# Patient Record
Sex: MALE | Race: WHITE | Employment: OTHER | URBAN - METROPOLITAN AREA
[De-identification: names, ages, dates, MRNs, and addresses within clinical notes are randomized per-mention and may not be internally consistent; named-entity substitution may affect disease eponyms.]

---

## 2020-12-08 ENCOUNTER — VIRTUAL VISIT (OUTPATIENT)
Dept: PRIMARY CARE CLINIC | Age: 75
End: 2020-12-08
Payer: MEDICARE

## 2020-12-08 PROCEDURE — 1123F ACP DISCUSS/DSCN MKR DOCD: CPT | Performed by: STUDENT IN AN ORGANIZED HEALTH CARE EDUCATION/TRAINING PROGRAM

## 2020-12-08 PROCEDURE — G8427 DOCREV CUR MEDS BY ELIG CLIN: HCPCS | Performed by: STUDENT IN AN ORGANIZED HEALTH CARE EDUCATION/TRAINING PROGRAM

## 2020-12-08 PROCEDURE — 1036F TOBACCO NON-USER: CPT | Performed by: STUDENT IN AN ORGANIZED HEALTH CARE EDUCATION/TRAINING PROGRAM

## 2020-12-08 PROCEDURE — G8484 FLU IMMUNIZE NO ADMIN: HCPCS | Performed by: STUDENT IN AN ORGANIZED HEALTH CARE EDUCATION/TRAINING PROGRAM

## 2020-12-08 PROCEDURE — 3017F COLORECTAL CA SCREEN DOC REV: CPT | Performed by: STUDENT IN AN ORGANIZED HEALTH CARE EDUCATION/TRAINING PROGRAM

## 2020-12-08 PROCEDURE — 4040F PNEUMOC VAC/ADMIN/RCVD: CPT | Performed by: STUDENT IN AN ORGANIZED HEALTH CARE EDUCATION/TRAINING PROGRAM

## 2020-12-08 PROCEDURE — 99203 OFFICE O/P NEW LOW 30 MIN: CPT | Performed by: STUDENT IN AN ORGANIZED HEALTH CARE EDUCATION/TRAINING PROGRAM

## 2020-12-08 PROCEDURE — G8421 BMI NOT CALCULATED: HCPCS | Performed by: STUDENT IN AN ORGANIZED HEALTH CARE EDUCATION/TRAINING PROGRAM

## 2020-12-08 RX ORDER — MONTELUKAST SODIUM 10 MG/1
10 TABLET ORAL NIGHTLY
Qty: 30 TABLET | Refills: 1 | Status: SHIPPED | OUTPATIENT
Start: 2020-12-08

## 2020-12-08 RX ORDER — METOPROLOL SUCCINATE 25 MG/1
12.5 TABLET, EXTENDED RELEASE ORAL DAILY
Qty: 30 TABLET | Refills: 0 | Status: SHIPPED | OUTPATIENT
Start: 2020-12-08

## 2020-12-08 RX ORDER — BLOOD PRESSURE TEST KIT
1 KIT MISCELLANEOUS DAILY
Qty: 1 KIT | Refills: 0 | Status: SHIPPED | OUTPATIENT
Start: 2020-12-08

## 2020-12-08 RX ORDER — LEVETIRACETAM 500 MG/1
500 TABLET ORAL 2 TIMES DAILY
Qty: 60 TABLET | Refills: 1 | Status: SHIPPED | OUTPATIENT
Start: 2020-12-08

## 2020-12-08 RX ORDER — CALCIUM CARB/VITAMIN D3/VIT K1 500-100-40
1 TABLET,CHEWABLE ORAL DAILY
Qty: 100 EACH | Refills: 3 | Status: SHIPPED | OUTPATIENT
Start: 2020-12-08

## 2020-12-08 RX ORDER — FOLIC ACID 1 MG/1
1 TABLET ORAL DAILY
COMMUNITY
End: 2020-12-08 | Stop reason: SDUPTHER

## 2020-12-08 RX ORDER — METOPROLOL SUCCINATE 25 MG/1
12.5 TABLET, EXTENDED RELEASE ORAL DAILY
COMMUNITY
End: 2020-12-08

## 2020-12-08 RX ORDER — FOLIC ACID 1 MG/1
1 TABLET ORAL DAILY
Qty: 30 TABLET | Refills: 2 | Status: SHIPPED | OUTPATIENT
Start: 2020-12-08 | End: 2020-12-09

## 2020-12-08 RX ORDER — NITROFURANTOIN 25; 75 MG/1; MG/1
100 CAPSULE ORAL 2 TIMES DAILY
Status: ON HOLD | COMMUNITY
End: 2020-12-14 | Stop reason: HOSPADM

## 2020-12-08 RX ORDER — LEVETIRACETAM 500 MG/1
500 TABLET ORAL 2 TIMES DAILY
COMMUNITY
End: 2020-12-08 | Stop reason: SDUPTHER

## 2020-12-08 RX ORDER — MONTELUKAST SODIUM 10 MG/1
10 TABLET ORAL NIGHTLY
COMMUNITY
End: 2020-12-08 | Stop reason: SDUPTHER

## 2020-12-08 RX ORDER — SERTRALINE HYDROCHLORIDE 25 MG/1
25 TABLET, FILM COATED ORAL DAILY
Qty: 30 TABLET | Refills: 1 | Status: SHIPPED | OUTPATIENT
Start: 2020-12-08

## 2020-12-08 SDOH — HEALTH STABILITY: MENTAL HEALTH: HOW OFTEN DO YOU HAVE A DRINK CONTAINING ALCOHOL?: NEVER

## 2020-12-08 SDOH — ECONOMIC STABILITY: TRANSPORTATION INSECURITY
IN THE PAST 12 MONTHS, HAS LACK OF TRANSPORTATION KEPT YOU FROM MEETINGS, WORK, OR FROM GETTING THINGS NEEDED FOR DAILY LIVING?: NO

## 2020-12-08 SDOH — ECONOMIC STABILITY: INCOME INSECURITY: HOW HARD IS IT FOR YOU TO PAY FOR THE VERY BASICS LIKE FOOD, HOUSING, MEDICAL CARE, AND HEATING?: NOT HARD AT ALL

## 2020-12-08 SDOH — ECONOMIC STABILITY: FOOD INSECURITY: WITHIN THE PAST 12 MONTHS, THE FOOD YOU BOUGHT JUST DIDN'T LAST AND YOU DIDN'T HAVE MONEY TO GET MORE.: NEVER TRUE

## 2020-12-08 SDOH — ECONOMIC STABILITY: FOOD INSECURITY: WITHIN THE PAST 12 MONTHS, YOU WORRIED THAT YOUR FOOD WOULD RUN OUT BEFORE YOU GOT MONEY TO BUY MORE.: NEVER TRUE

## 2020-12-08 SDOH — ECONOMIC STABILITY: TRANSPORTATION INSECURITY
IN THE PAST 12 MONTHS, HAS THE LACK OF TRANSPORTATION KEPT YOU FROM MEDICAL APPOINTMENTS OR FROM GETTING MEDICATIONS?: NO

## 2020-12-08 ASSESSMENT — PATIENT HEALTH QUESTIONNAIRE - PHQ9
SUM OF ALL RESPONSES TO PHQ QUESTIONS 1-9: 2
1. LITTLE INTEREST OR PLEASURE IN DOING THINGS: 1
SUM OF ALL RESPONSES TO PHQ QUESTIONS 1-9: 2
SUM OF ALL RESPONSES TO PHQ9 QUESTIONS 1 & 2: 2
SUM OF ALL RESPONSES TO PHQ QUESTIONS 1-9: 2
2. FEELING DOWN, DEPRESSED OR HOPELESS: 1

## 2020-12-08 NOTE — PROGRESS NOTES
2020    TELEHEALTH EVALUATION -- Audio/Visual (During ZDUBG-51 public health emergency)    HPI:    Kiley Galeas (:  1945) has requested an audio/video evaluation for the following concern(s):    Recent admission for subdural hematoma has been falling frequently. Zeynep Noe in July and then started talking about nazi, CT head was normal sent him home then neurologist dx him with dementia then a few weeks later fell again, and had several areas of bleeding in brain, was put on keppra to help blood reabsorb to the brain, was sent to rehab but did not do well, has lost 70 pounds, and has been in bed for past 3 1/2 months, needs physical and occupational therapy. On prasugrel for CAD  Was in a nursing home at [facility] records reviewed today  Wife still lives in [place] and is 21 years younger. Daughter unsure of what medications he is actually on and has just been going by the list that was given to her from the discharge from the hospital [place]. 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 2020 and after taking the pill below started having all of these issues  Was told he needs removal  But then he declined and was started on some pill for [address] metastatic CA which he did not have. Was prescribed from dermatology not oncology   Had imaging done and on elbow x-ray showed osteopenia    Recent UTI  On Macrobid  No fevers, odourous urine, or hematuria  Will have urgency    Dementia  On donepizil but daughter doesn't think he needs it because she feels dementia is from brain bleed and not actual dementia/alzheimers.   Was put on keppra and she has noticed some difference     Falls frequently    CAD  Prasugrel  Rosuvastatin    Type 2 diabetes  Sliding scale insulin NovoLog  Metformin 500 mg daily    Hypertension  Metoprolol 12.5 mg daily    Vitamin D deficiency  Vitamin D 1000 units daily    BPH  Tamsulosin 0.4 mg daily Dutasteride 0.5 mg daily      Review of Systems   Constitutional: Negative for chills and fever. HENT: Negative for congestion, rhinorrhea and sore throat. Eyes: Negative for visual disturbance. Respiratory: Negative for cough and shortness of breath. Cardiovascular: Negative for chest pain. Gastrointestinal: Negative for constipation, diarrhea, nausea and vomiting. Endocrine: Negative for polydipsia and polyuria. Genitourinary: Negative for dysuria. Musculoskeletal: Negative for arthralgias. Skin: Negative for rash. Allergic/Immunologic: Negative for environmental allergies. Neurological: Negative for headaches. Psychiatric/Behavioral: The patient is not nervous/anxious. Prior to Visit Medications    Medication Sig Taking?  Authorizing Provider   Insulin Syringe-Needle U-100 (INSULIN SYRINGE .3CC/31GX5/16\") 31G X 5/16\" 0.3 ML MISC 1 each by Does not apply route daily Yes Catarino Huerta MD   levETIRAcetam (KEPPRA) 500 MG tablet Take 1 tablet by mouth 2 times daily Yes Catarino Huerta MD   metFORMIN (GLUCOPHAGE) 500 MG tablet Take 1 tablet by mouth 2 times daily (with meals) Yes Catarino Huerta MD   montelukast (SINGULAIR) 10 MG tablet Take 1 tablet by mouth nightly Yes Catarino Huerta MD   sertraline (ZOLOFT) 25 MG tablet Take 1 tablet by mouth daily Yes Catarino Huerta MD   metoprolol succinate (TOPROL XL) 25 MG extended release tablet Take 0.5 tablets by mouth daily Yes Catarino Huerta MD   Blood Pressure KIT 1 kit by Does not apply route daily Yes Catarino Huerta MD   dexamethasone (DECADRON) 0.5 MG tablet Take 1 tablet by mouth daily (with breakfast) for 9 days  James Marcos MD   QUEtiapine (SEROQUEL) 25 MG tablet Take 0.5 tablets by mouth 2 times daily as needed for Agitation  DANIEL Hamm   Donepezil HCl (ARICEPT) 23 MG TABS tablet Take 23 mg by mouth nightly  Historical Provider, MD famotidine (PEPCID) 20 MG tablet Take 1 tablet by mouth 2 times daily  Rory Leon MD   folic acid (FOLVITE) 1 MG tablet Take 1 tablet by mouth daily  Rory Leon MD   dutasteride (AVODART) 0.5 MG capsule Take 1 capsule by mouth daily  Rory Leon MD   tamsulosin (FLOMAX) 0.4 MG capsule Take 1 capsule by mouth daily  Rory Leon MD   rosuvastatin (CRESTOR) 40 MG tablet Take 1 tablet by mouth daily  Rory Leon MD   prasugrel (EFFIENT) 5 MG TABS Take 1 tablet by mouth daily  Rory Leon MD       Social History     Tobacco Use    Smoking status: Former Smoker     Types: Cigarettes     Quit date:      Years since quittin.9    Smokeless tobacco: Never Used   Substance Use Topics    Alcohol use: Never     Frequency: Never    Drug use: Never        Physical Exam  Vitals signs reviewed. Constitutional:       General: He is not in acute distress. Appearance: He is ill-appearing. Comments: Patient not able to follow commands, sleeping, moaning   HENT:      Head: Normocephalic and atraumatic. Right Ear: External ear normal.      Left Ear: External ear normal.   Eyes:      General: No scleral icterus. Right eye: No discharge. Left eye: No discharge. Extraocular Movements: Extraocular movements intact. Conjunctiva/sclera: Conjunctivae normal.   Neck:      Musculoskeletal: Neck supple. Pulmonary:      Effort: Pulmonary effort is normal. No respiratory distress. Skin:     Coloration: Skin is not jaundiced. Findings: No lesion or rash. Neurological:      Mental Status: He is alert. Cranial Nerves: No cranial nerve deficit.       Coordination: Coordination normal.   Psychiatric:      Comments: Sleeping       ASSESSMENT/PLAN: 80-year-old with recent subdural hematoma and since then has had steady decline in function. Was diagnosed with Alzheimer's although unsure if any formal evaluation was ever done this was after his subdural hematoma. He is on a lot of medications for which I am not entirely sure he needs to be on. We will continue most of them but do not think he should be on Aricept as this probably has been making his level of consciousness worse. We will get him set up for home health care and would like to follow-up in a couple of weeks. 1. Subdural hematoma ([facility] [place] [address]  - [address], LSW, Social Work, [employer]  - OT home evaluation; Future  - PT home eval; Future  - AFL - Diamond Paige MD, Neurology, Annette Whitley MD, Oncology, Kevin Nguyen MD, Cardiology, [facility]  - External Referral To Home Health    2. Late onset Alzheimer's disease without behavioral disturbance ([facility] [place] [address]  Decrease Aricept sent this seems to be making his level consciousness worse per daughter. Return in about 2 weeks (around 12/22/2020). Christian Lam is a 76 y.o. male being evaluated by a Virtual Visit (video visit) encounter to address concerns as mentioned above. A caregiver was present when appropriate. Due to this being a TeleHealth encounter (During YVRWJ-21 public health emergency), evaluation of the following organ systems was limited: Vitals/Constitutional/EENT/Resp/CV/GI//MS/Neuro/Skin/Heme-Lymph-Imm. Pursuant to the emergency declaration under the [address] [place] [address] and the [employer] Act, this Virtual Visit was conducted with patient's (and/or legal guardian's) consent, to reduce the patient's risk of exposure to COVID-19 and provide necessary medical care. The patient (and/or legal guardian) has also been advised to contact this office for worsening conditions or problems, and seek emergency medical treatment and/or call 911 if deemed necessary. Patient identification was verified at the start of the visit: Yes    Total time spent on this encounter: Not billed by time    Services were provided through a video synchronous discussion virtually to substitute for in-person clinic visit. Patient and provider were located at their individual homes. --Ivett Soto MD on 12/16/2020 at 3:58 PM    An electronic signature was used to authenticate this note.

## 2020-12-08 NOTE — PATIENT INSTRUCTIONS
[address] [facility], [facility]   [address] [facility], [address] [place] [address]   Ph: [phone]     Neurology  [address] [place] [address] [address] [facility], [address]   [facility] [place] [address]   Phone: [phone]   Fax: [phone]     Cardiology  [address], [address] [place] [address], MD   [address] , [address] [facility], [address] [place] [address]   [phone]     Hematology/Oncology  [facility], [address] [place] [address], MD   [address] [facility], [address] [place] [address], [address] [place] [address]   Phone: [phone]   Fax: [phone]

## 2020-12-08 NOTE — Clinical Note
Needs another appointment, would prefer in clinic, but if he cant come that's ok, he will need labs and vitals so hopefully home health can do that.

## 2020-12-09 ENCOUNTER — APPOINTMENT (OUTPATIENT)
Dept: GENERAL RADIOLOGY | Age: 75
DRG: 177 | End: 2020-12-09
Payer: MEDICARE

## 2020-12-09 ENCOUNTER — TELEPHONE (OUTPATIENT)
Dept: PRIMARY CARE CLINIC | Age: 75
End: 2020-12-09

## 2020-12-09 ENCOUNTER — PATIENT MESSAGE (OUTPATIENT)
Dept: PRIMARY CARE CLINIC | Age: 75
End: 2020-12-09

## 2020-12-09 ENCOUNTER — APPOINTMENT (OUTPATIENT)
Dept: CT IMAGING | Age: 75
DRG: 177 | End: 2020-12-09
Payer: MEDICARE

## 2020-12-09 ENCOUNTER — HOSPITAL ENCOUNTER (INPATIENT)
Age: 75
LOS: 4 days | Discharge: HOME HEALTH | DRG: 177 | End: 2020-12-14
Attending: EMERGENCY MEDICINE | Admitting: INTERNAL MEDICINE
Payer: MEDICARE

## 2020-12-09 LAB
A/G RATIO: 1 (ref 1.1–2.2)
ALBUMIN SERPL-MCNC: 3 G/DL (ref 3.4–5)
ALP BLD-CCNC: 147 U/L (ref 40–129)
ALT SERPL-CCNC: 24 U/L (ref 10–40)
ANION GAP SERPL CALCULATED.3IONS-SCNC: 8 MMOL/L (ref 3–16)
AST SERPL-CCNC: 33 U/L (ref 15–37)
BASE EXCESS VENOUS: 3.1 MMOL/L (ref -3–3)
BASOPHILS ABSOLUTE: 0 K/UL (ref 0–0.2)
BASOPHILS RELATIVE PERCENT: 0.4 %
BILIRUB SERPL-MCNC: 0.3 MG/DL (ref 0–1)
BUN BLDV-MCNC: 15 MG/DL (ref 7–20)
CALCIUM SERPL-MCNC: 8.4 MG/DL (ref 8.3–10.6)
CARBOXYHEMOGLOBIN: 1.4 % (ref 0–1.5)
CHLORIDE BLD-SCNC: 89 MMOL/L (ref 99–110)
CO2: 30 MMOL/L (ref 21–32)
CREAT SERPL-MCNC: 0.7 MG/DL (ref 0.8–1.3)
EOSINOPHILS ABSOLUTE: 0 K/UL (ref 0–0.6)
EOSINOPHILS RELATIVE PERCENT: 0.7 %
GFR AFRICAN AMERICAN: >60
GFR NON-AFRICAN AMERICAN: >60
GLOBULIN: 3.1 G/DL
GLUCOSE BLD-MCNC: 103 MG/DL (ref 70–99)
HCO3 VENOUS: 28.5 MMOL/L (ref 23–29)
HCT VFR BLD CALC: 35.4 % (ref 40.5–52.5)
HEMOGLOBIN: 12.1 G/DL (ref 13.5–17.5)
LACTIC ACID: 0.9 MMOL/L (ref 0.4–2)
LYMPHOCYTES ABSOLUTE: 1.1 K/UL (ref 1–5.1)
LYMPHOCYTES RELATIVE PERCENT: 15.8 %
MAGNESIUM: 1.8 MG/DL (ref 1.8–2.4)
MCH RBC QN AUTO: 31.7 PG (ref 26–34)
MCHC RBC AUTO-ENTMCNC: 34.2 G/DL (ref 31–36)
MCV RBC AUTO: 92.6 FL (ref 80–100)
METHEMOGLOBIN VENOUS: 0.3 %
MONOCYTES ABSOLUTE: 0.6 K/UL (ref 0–1.3)
MONOCYTES RELATIVE PERCENT: 8.6 %
NEUTROPHILS ABSOLUTE: 5.1 K/UL (ref 1.7–7.7)
NEUTROPHILS RELATIVE PERCENT: 74.5 %
O2 CONTENT, VEN: 16 VOL %
O2 SAT, VEN: 87 %
O2 THERAPY: ABNORMAL
PCO2, VEN: 46.7 MMHG (ref 40–50)
PDW BLD-RTO: 13.3 % (ref 12.4–15.4)
PH VENOUS: 7.4 (ref 7.35–7.45)
PLATELET # BLD: 381 K/UL (ref 135–450)
PMV BLD AUTO: 7 FL (ref 5–10.5)
PO2, VEN: 51.9 MMHG (ref 25–40)
POTASSIUM REFLEX MAGNESIUM: 3.2 MMOL/L (ref 3.5–5.1)
PRO-BNP: 582 PG/ML (ref 0–449)
RBC # BLD: 3.82 M/UL (ref 4.2–5.9)
SARS-COV-2, NAAT: DETECTED
SODIUM BLD-SCNC: 127 MMOL/L (ref 136–145)
TCO2 CALC VENOUS: 30 MMOL/L
TOTAL PROTEIN: 6.1 G/DL (ref 6.4–8.2)
TROPONIN: <0.01 NG/ML
WBC # BLD: 6.8 K/UL (ref 4–11)

## 2020-12-09 PROCEDURE — 96366 THER/PROPH/DIAG IV INF ADDON: CPT

## 2020-12-09 PROCEDURE — 94761 N-INVAS EAR/PLS OXIMETRY MLT: CPT

## 2020-12-09 PROCEDURE — 93005 ELECTROCARDIOGRAM TRACING: CPT | Performed by: EMERGENCY MEDICINE

## 2020-12-09 PROCEDURE — 80053 COMPREHEN METABOLIC PANEL: CPT

## 2020-12-09 PROCEDURE — 70450 CT HEAD/BRAIN W/O DYE: CPT

## 2020-12-09 PROCEDURE — 6370000000 HC RX 637 (ALT 250 FOR IP): Performed by: EMERGENCY MEDICINE

## 2020-12-09 PROCEDURE — 99285 EMERGENCY DEPT VISIT HI MDM: CPT

## 2020-12-09 PROCEDURE — 83605 ASSAY OF LACTIC ACID: CPT

## 2020-12-09 PROCEDURE — 84484 ASSAY OF TROPONIN QUANT: CPT

## 2020-12-09 PROCEDURE — 6360000002 HC RX W HCPCS: Performed by: EMERGENCY MEDICINE

## 2020-12-09 PROCEDURE — 96365 THER/PROPH/DIAG IV INF INIT: CPT

## 2020-12-09 PROCEDURE — 83735 ASSAY OF MAGNESIUM: CPT

## 2020-12-09 PROCEDURE — 85025 COMPLETE CBC W/AUTO DIFF WBC: CPT

## 2020-12-09 PROCEDURE — U0002 COVID-19 LAB TEST NON-CDC: HCPCS

## 2020-12-09 PROCEDURE — 83930 ASSAY OF BLOOD OSMOLALITY: CPT

## 2020-12-09 PROCEDURE — 83880 ASSAY OF NATRIURETIC PEPTIDE: CPT

## 2020-12-09 PROCEDURE — 2500000003 HC RX 250 WO HCPCS: Performed by: EMERGENCY MEDICINE

## 2020-12-09 PROCEDURE — 82803 BLOOD GASES ANY COMBINATION: CPT

## 2020-12-09 PROCEDURE — 2700000000 HC OXYGEN THERAPY PER DAY

## 2020-12-09 PROCEDURE — 2580000003 HC RX 258: Performed by: EMERGENCY MEDICINE

## 2020-12-09 PROCEDURE — 96375 TX/PRO/DX INJ NEW DRUG ADDON: CPT

## 2020-12-09 PROCEDURE — 71045 X-RAY EXAM CHEST 1 VIEW: CPT

## 2020-12-09 RX ORDER — PRASUGREL 5 MG/1
5 TABLET, FILM COATED ORAL DAILY
Qty: 30 TABLET | Refills: 1 | Status: SHIPPED | OUTPATIENT
Start: 2020-12-09

## 2020-12-09 RX ORDER — DUTASTERIDE 0.5 MG/1
0.5 CAPSULE, LIQUID FILLED ORAL DAILY
Qty: 30 CAPSULE | Refills: 3 | Status: SHIPPED | OUTPATIENT
Start: 2020-12-09

## 2020-12-09 RX ORDER — IPRATROPIUM BROMIDE AND ALBUTEROL SULFATE 2.5; .5 MG/3ML; MG/3ML
1 SOLUTION RESPIRATORY (INHALATION) ONCE
Status: COMPLETED | OUTPATIENT
Start: 2020-12-09 | End: 2020-12-09

## 2020-12-09 RX ORDER — FAMOTIDINE 20 MG/1
20 TABLET, FILM COATED ORAL 2 TIMES DAILY
Qty: 180 TABLET | Refills: 1 | Status: SHIPPED | OUTPATIENT
Start: 2020-12-09

## 2020-12-09 RX ORDER — ROSUVASTATIN CALCIUM 40 MG/1
40 TABLET, COATED ORAL DAILY
Qty: 30 TABLET | Refills: 0 | Status: SHIPPED | OUTPATIENT
Start: 2020-12-09

## 2020-12-09 RX ORDER — POTASSIUM CHLORIDE 7.45 MG/ML
10 INJECTION INTRAVENOUS ONCE
Status: COMPLETED | OUTPATIENT
Start: 2020-12-09 | End: 2020-12-09

## 2020-12-09 RX ORDER — TAMSULOSIN HYDROCHLORIDE 0.4 MG/1
0.4 CAPSULE ORAL DAILY
Qty: 90 CAPSULE | Refills: 1 | Status: SHIPPED | OUTPATIENT
Start: 2020-12-09

## 2020-12-09 RX ORDER — 0.9 % SODIUM CHLORIDE 0.9 %
500 INTRAVENOUS SOLUTION INTRAVENOUS ONCE
Status: COMPLETED | OUTPATIENT
Start: 2020-12-09 | End: 2020-12-09

## 2020-12-09 RX ORDER — DEXAMETHASONE SODIUM PHOSPHATE 10 MG/ML
4 INJECTION, SOLUTION INTRAMUSCULAR; INTRAVENOUS ONCE
Status: COMPLETED | OUTPATIENT
Start: 2020-12-09 | End: 2020-12-09

## 2020-12-09 RX ORDER — FOLIC ACID 1 MG/1
1 TABLET ORAL DAILY
Qty: 30 TABLET | Refills: 2 | Status: SHIPPED | OUTPATIENT
Start: 2020-12-09

## 2020-12-09 RX ADMIN — SODIUM CHLORIDE 500 ML: 9 INJECTION, SOLUTION INTRAVENOUS at 20:44

## 2020-12-09 RX ADMIN — POTASSIUM CHLORIDE 10 MEQ: 10 INJECTION, SOLUTION INTRAVENOUS at 20:43

## 2020-12-09 RX ADMIN — IPRATROPIUM BROMIDE AND ALBUTEROL SULFATE 1 AMPULE: .5; 3 SOLUTION RESPIRATORY (INHALATION) at 20:43

## 2020-12-09 RX ADMIN — DEXAMETHASONE SODIUM PHOSPHATE 4 MG: 10 INJECTION, SOLUTION INTRAMUSCULAR; INTRAVENOUS at 21:48

## 2020-12-09 RX ADMIN — DOXYCYCLINE 100 MG: 100 INJECTION, POWDER, LYOPHILIZED, FOR SOLUTION INTRAVENOUS at 21:47

## 2020-12-09 NOTE — TELEPHONE ENCOUNTER
SW Contact:  PC to caregiver per referral for home care assistance. Pt presents with ADL deficits as well as cognitive. Pt is now permanent resident [place] moving from [place]. Caregiver has appt with  for legal decision making paperwork. Discussed the importance of referrals for consults to determine baseline cognition in relation to brain bleed vs dementia and determination of status of disease process. Caregiver states she does not work and handles med administration dressing, bathing and meals for pt. Agreeable to referral to [facility] who will contact her today to schedule a case management visit.

## 2020-12-09 NOTE — TELEPHONE ENCOUNTER
F/U call to daughter who states [facility] has a 2 week waiting list for assessment visits. Referral made to [facility] for caregiver education, PT and OT.

## 2020-12-10 PROBLEM — J96.01 ACUTE RESPIRATORY FAILURE WITH HYPOXIA (HCC): Status: ACTIVE | Noted: 2020-12-10

## 2020-12-10 LAB
ABO/RH: NORMAL
ANTIBODY SCREEN: NORMAL
BACTERIA: ABNORMAL /HPF
BILIRUBIN URINE: NEGATIVE
BLOOD BANK DISPENSE STATUS: NORMAL
BLOOD BANK PRODUCT CODE: NORMAL
BLOOD, URINE: ABNORMAL
BPU ID: NORMAL
CLARITY: CLEAR
COARSE CASTS, UA: ABNORMAL /LPF (ref 0–2)
COLOR: YELLOW
DESCRIPTION BLOOD BANK: NORMAL
EKG ATRIAL RATE: 78 BPM
EKG DIAGNOSIS: NORMAL
EKG P-R INTERVAL: 126 MS
EKG Q-T INTERVAL: 438 MS
EKG QRS DURATION: 100 MS
EKG QTC CALCULATION (BAZETT): 499 MS
EKG R AXIS: 44 DEGREES
EKG T AXIS: 61 DEGREES
EKG VENTRICULAR RATE: 78 BPM
EPITHELIAL CELLS, UA: ABNORMAL /HPF (ref 0–5)
GLUCOSE URINE: NEGATIVE MG/DL
HYALINE CASTS: ABNORMAL /LPF (ref 0–2)
KETONES, URINE: NEGATIVE MG/DL
LEUKOCYTE ESTERASE, URINE: NEGATIVE
MICROSCOPIC EXAMINATION: YES
NITRITE, URINE: NEGATIVE
OSMOLALITY URINE: 446 MOSM/KG (ref 390–1070)
OSMOLALITY: 273 MOSM/KG (ref 280–301)
PH UA: 6 (ref 5–8)
PROTEIN UA: 30 MG/DL
RBC UA: ABNORMAL /HPF (ref 0–4)
RENAL EPITHELIAL, UA: ABNORMAL /HPF (ref 0–1)
SPECIFIC GRAVITY UA: 1.02 (ref 1–1.03)
URINE REFLEX TO CULTURE: ABNORMAL
URINE TYPE: ABNORMAL
UROBILINOGEN, URINE: 0.2 E.U./DL
WBC UA: ABNORMAL /HPF (ref 0–5)

## 2020-12-10 PROCEDURE — 2500000003 HC RX 250 WO HCPCS: Performed by: INTERNAL MEDICINE

## 2020-12-10 PROCEDURE — 6360000002 HC RX W HCPCS: Performed by: INTERNAL MEDICINE

## 2020-12-10 PROCEDURE — 93010 ELECTROCARDIOGRAM REPORT: CPT | Performed by: INTERNAL MEDICINE

## 2020-12-10 PROCEDURE — 6370000000 HC RX 637 (ALT 250 FOR IP): Performed by: INTERNAL MEDICINE

## 2020-12-10 PROCEDURE — 86901 BLOOD TYPING SEROLOGIC RH(D): CPT

## 2020-12-10 PROCEDURE — XW033E5 INTRODUCTION OF REMDESIVIR ANTI-INFECTIVE INTO PERIPHERAL VEIN, PERCUTANEOUS APPROACH, NEW TECHNOLOGY GROUP 5: ICD-10-PCS | Performed by: INTERNAL MEDICINE

## 2020-12-10 PROCEDURE — 81001 URINALYSIS AUTO W/SCOPE: CPT

## 2020-12-10 PROCEDURE — 2580000003 HC RX 258: Performed by: INTERNAL MEDICINE

## 2020-12-10 PROCEDURE — 83935 ASSAY OF URINE OSMOLALITY: CPT

## 2020-12-10 PROCEDURE — 2060000000 HC ICU INTERMEDIATE R&B

## 2020-12-10 PROCEDURE — 6370000000 HC RX 637 (ALT 250 FOR IP): Performed by: PHYSICIAN ASSISTANT

## 2020-12-10 PROCEDURE — 36430 TRANSFUSION BLD/BLD COMPNT: CPT

## 2020-12-10 PROCEDURE — XW13325 TRANSFUSION OF CONVALESCENT PLASMA (NONAUTOLOGOUS) INTO PERIPHERAL VEIN, PERCUTANEOUS APPROACH, NEW TECHNOLOGY GROUP 5: ICD-10-PCS | Performed by: INTERNAL MEDICINE

## 2020-12-10 PROCEDURE — 86850 RBC ANTIBODY SCREEN: CPT

## 2020-12-10 PROCEDURE — 86900 BLOOD TYPING SEROLOGIC ABO: CPT

## 2020-12-10 PROCEDURE — 99223 1ST HOSP IP/OBS HIGH 75: CPT | Performed by: INTERNAL MEDICINE

## 2020-12-10 RX ORDER — TAMSULOSIN HYDROCHLORIDE 0.4 MG/1
0.4 CAPSULE ORAL DAILY
Status: DISCONTINUED | OUTPATIENT
Start: 2020-12-10 | End: 2020-12-14 | Stop reason: HOSPADM

## 2020-12-10 RX ORDER — MAGNESIUM SULFATE IN WATER 40 MG/ML
2 INJECTION, SOLUTION INTRAVENOUS PRN
Status: DISCONTINUED | OUTPATIENT
Start: 2020-12-10 | End: 2020-12-14 | Stop reason: HOSPADM

## 2020-12-10 RX ORDER — PROCHLORPERAZINE EDISYLATE 5 MG/ML
10 INJECTION INTRAMUSCULAR; INTRAVENOUS EVERY 6 HOURS PRN
Status: DISCONTINUED | OUTPATIENT
Start: 2020-12-10 | End: 2020-12-14 | Stop reason: HOSPADM

## 2020-12-10 RX ORDER — METOPROLOL SUCCINATE 25 MG/1
12.5 TABLET, EXTENDED RELEASE ORAL DAILY
Status: DISCONTINUED | OUTPATIENT
Start: 2020-12-10 | End: 2020-12-14 | Stop reason: HOSPADM

## 2020-12-10 RX ORDER — SODIUM CHLORIDE 0.9 % (FLUSH) 0.9 %
10 SYRINGE (ML) INJECTION PRN
Status: DISCONTINUED | OUTPATIENT
Start: 2020-12-10 | End: 2020-12-14 | Stop reason: HOSPADM

## 2020-12-10 RX ORDER — ROSUVASTATIN CALCIUM 10 MG/1
40 TABLET, COATED ORAL DAILY
Status: DISCONTINUED | OUTPATIENT
Start: 2020-12-10 | End: 2020-12-14 | Stop reason: HOSPADM

## 2020-12-10 RX ORDER — ALBUTEROL SULFATE 90 UG/1
2 AEROSOL, METERED RESPIRATORY (INHALATION)
Status: DISCONTINUED | OUTPATIENT
Start: 2020-12-10 | End: 2020-12-10

## 2020-12-10 RX ORDER — SODIUM CHLORIDE 9 MG/ML
INJECTION, SOLUTION INTRAVENOUS CONTINUOUS
Status: DISCONTINUED | OUTPATIENT
Start: 2020-12-10 | End: 2020-12-11

## 2020-12-10 RX ORDER — DEXAMETHASONE SODIUM PHOSPHATE 4 MG/ML
6 INJECTION, SOLUTION INTRA-ARTICULAR; INTRALESIONAL; INTRAMUSCULAR; INTRAVENOUS; SOFT TISSUE DAILY
Status: DISCONTINUED | OUTPATIENT
Start: 2020-12-11 | End: 2020-12-12 | Stop reason: CLARIF

## 2020-12-10 RX ORDER — LEVETIRACETAM 500 MG/1
500 TABLET ORAL 2 TIMES DAILY
Status: DISCONTINUED | OUTPATIENT
Start: 2020-12-10 | End: 2020-12-14 | Stop reason: HOSPADM

## 2020-12-10 RX ORDER — POLYETHYLENE GLYCOL 3350 17 G/17G
17 POWDER, FOR SOLUTION ORAL DAILY PRN
Status: DISCONTINUED | OUTPATIENT
Start: 2020-12-10 | End: 2020-12-14 | Stop reason: HOSPADM

## 2020-12-10 RX ORDER — POTASSIUM CHLORIDE 7.45 MG/ML
10 INJECTION INTRAVENOUS PRN
Status: DISCONTINUED | OUTPATIENT
Start: 2020-12-10 | End: 2020-12-14 | Stop reason: HOSPADM

## 2020-12-10 RX ORDER — SODIUM CHLORIDE 0.9 % (FLUSH) 0.9 %
10 SYRINGE (ML) INJECTION EVERY 12 HOURS SCHEDULED
Status: DISCONTINUED | OUTPATIENT
Start: 2020-12-10 | End: 2020-12-14 | Stop reason: HOSPADM

## 2020-12-10 RX ORDER — METHYLPREDNISOLONE SODIUM SUCCINATE 40 MG/ML
40 INJECTION, POWDER, LYOPHILIZED, FOR SOLUTION INTRAMUSCULAR; INTRAVENOUS EVERY 12 HOURS
Status: DISCONTINUED | OUTPATIENT
Start: 2020-12-10 | End: 2020-12-10

## 2020-12-10 RX ORDER — PRASUGREL 5 MG/1
5 TABLET, FILM COATED ORAL DAILY
Status: DISCONTINUED | OUTPATIENT
Start: 2020-12-10 | End: 2020-12-14 | Stop reason: HOSPADM

## 2020-12-10 RX ORDER — ALBUTEROL SULFATE 90 UG/1
2 AEROSOL, METERED RESPIRATORY (INHALATION) EVERY 6 HOURS PRN
Status: DISCONTINUED | OUTPATIENT
Start: 2020-12-10 | End: 2020-12-10

## 2020-12-10 RX ORDER — FINASTERIDE 5 MG/1
5 TABLET, FILM COATED ORAL DAILY
Status: DISCONTINUED | OUTPATIENT
Start: 2020-12-10 | End: 2020-12-14 | Stop reason: HOSPADM

## 2020-12-10 RX ORDER — ALBUTEROL SULFATE 90 UG/1
2 AEROSOL, METERED RESPIRATORY (INHALATION) EVERY 4 HOURS PRN
Status: DISCONTINUED | OUTPATIENT
Start: 2020-12-10 | End: 2020-12-14 | Stop reason: HOSPADM

## 2020-12-10 RX ORDER — FOLIC ACID 1 MG/1
1 TABLET ORAL DAILY
Status: DISCONTINUED | OUTPATIENT
Start: 2020-12-10 | End: 2020-12-14 | Stop reason: HOSPADM

## 2020-12-10 RX ORDER — ACETAMINOPHEN 650 MG/1
650 SUPPOSITORY RECTAL EVERY 6 HOURS PRN
Status: DISCONTINUED | OUTPATIENT
Start: 2020-12-10 | End: 2020-12-14 | Stop reason: HOSPADM

## 2020-12-10 RX ORDER — 0.9 % SODIUM CHLORIDE 0.9 %
20 INTRAVENOUS SOLUTION INTRAVENOUS ONCE
Status: DISCONTINUED | OUTPATIENT
Start: 2020-12-10 | End: 2020-12-14 | Stop reason: HOSPADM

## 2020-12-10 RX ORDER — ACETAMINOPHEN 325 MG/1
650 TABLET ORAL EVERY 6 HOURS PRN
Status: DISCONTINUED | OUTPATIENT
Start: 2020-12-10 | End: 2020-12-14 | Stop reason: HOSPADM

## 2020-12-10 RX ORDER — MONTELUKAST SODIUM 10 MG/1
10 TABLET ORAL NIGHTLY
Status: DISCONTINUED | OUTPATIENT
Start: 2020-12-10 | End: 2020-12-14 | Stop reason: HOSPADM

## 2020-12-10 RX ORDER — FAMOTIDINE 20 MG/1
20 TABLET, FILM COATED ORAL 2 TIMES DAILY
Status: DISCONTINUED | OUTPATIENT
Start: 2020-12-10 | End: 2020-12-14 | Stop reason: HOSPADM

## 2020-12-10 RX ADMIN — SODIUM CHLORIDE: 9 INJECTION, SOLUTION INTRAVENOUS at 18:47

## 2020-12-10 RX ADMIN — FAMOTIDINE 20 MG: 20 TABLET ORAL at 21:39

## 2020-12-10 RX ADMIN — LEVETIRACETAM 500 MG: 500 TABLET ORAL at 09:46

## 2020-12-10 RX ADMIN — PRASUGREL 5 MG: 5 TABLET, FILM COATED ORAL at 09:47

## 2020-12-10 RX ADMIN — METHYLPREDNISOLONE SODIUM SUCCINATE 40 MG: 40 INJECTION, POWDER, FOR SOLUTION INTRAMUSCULAR; INTRAVENOUS at 09:42

## 2020-12-10 RX ADMIN — REMDESIVIR 200 MG: 100 INJECTION, POWDER, LYOPHILIZED, FOR SOLUTION INTRAVENOUS at 04:43

## 2020-12-10 RX ADMIN — FOLIC ACID 1 MG: 1 TABLET ORAL at 09:47

## 2020-12-10 RX ADMIN — FINASTERIDE 5 MG: 5 TABLET, FILM COATED ORAL at 09:48

## 2020-12-10 RX ADMIN — METOPROLOL SUCCINATE 12.5 MG: 25 TABLET, FILM COATED, EXTENDED RELEASE ORAL at 09:48

## 2020-12-10 RX ADMIN — SODIUM CHLORIDE: 9 INJECTION, SOLUTION INTRAVENOUS at 02:58

## 2020-12-10 RX ADMIN — ENOXAPARIN SODIUM 30 MG: 30 INJECTION, SOLUTION INTRAVENOUS; SUBCUTANEOUS at 21:39

## 2020-12-10 RX ADMIN — SERTRALINE HYDROCHLORIDE 25 MG: 50 TABLET ORAL at 09:47

## 2020-12-10 RX ADMIN — MONTELUKAST SODIUM 10 MG: 10 TABLET, COATED ORAL at 22:31

## 2020-12-10 RX ADMIN — LEVETIRACETAM 500 MG: 500 TABLET ORAL at 21:39

## 2020-12-10 RX ADMIN — TAMSULOSIN HYDROCHLORIDE 0.4 MG: 0.4 CAPSULE ORAL at 09:46

## 2020-12-10 RX ADMIN — ROSUVASTATIN CALCIUM 40 MG: 10 TABLET, FILM COATED ORAL at 09:45

## 2020-12-10 ASSESSMENT — PAIN SCALES - GENERAL
PAINLEVEL_OUTOF10: 0
PAINLEVEL_OUTOF10: 0

## 2020-12-10 NOTE — CARE COORDINATION
Case Management Assessment  Initial Evaluation      Patient Name: Amparo Youngblood  YOB: 1945  Diagnosis: Acute respiratory failure with hypoxia ([facility] [address] [J96.01]  Acute respiratory failure with hypoxia ([facility] [address] [J96.01]  Date / Time: 12/9/2020  6:40 PM    Admission status/Date: Assessed while in the Emergency Room  Chart Reviewed: Yes      Patient Casandra Goins: No -pt's contact number is his daughters Angelica Lias:  Yes - daughter Celestine Grey at [phone]      Hospitalization in the last 30 days:  No      Health Care Decision Maker :     (CM - must 1st enter selection under Navigator - emergency contact- Health Care Decision Maker Relationship and pick relationship)   Who do you trust or have selected to make healthcare decisions for you      Met with: Spoke with pt's daughter Carolina Hoff conducted  (bedside/phone): phone call    Current PCP: Dr. Matthias Ames required for SNF : N          3 night stay required - Y-unless waived due to covid-19    ADLS  Support Systems/Care Needs:  Family   Transportation: EMS transportation    Meal Preparation: family     Housing  Living Arrangements: Pt lives with his daughter Myra Claude  Steps: 2  Intent for return to present living arrangements: Yes per daughter Myra Claude  Identified Issues: None noted    Home Care Information  Active with [address] : No Agency:(Services)     Passport/Waiver : No  :                      Phone Number:    Passport/Waiver Services: n/a          Durable Medical Equiptment   DME Provider:   Equipment:   Walker___Cane___RTS___ BSC___Shower Chair_x__Hospital Bed_x__W/C__x__Other________  02 at ____Liter(s)---wears(frequency)_______ HHN ___ CPAP___ BiPap___   N/A___      Home O2 Use :  No    If No for home O2---if presently on O2 during hospitalization:  Yes  if yes CM to follow for potential DC O2 need  Informed of need for care provider to bring portable home O2 doug on day of discharge for nursing to connect prior to leaving:   Not Indicated  Verbalized agreement/Understanding:   Not Indicated    Community Service Affiliation  Dialysis:  No    · Agency:  · Location:  · Dialysis Schedule:  · Phone:   · Fax: Other Community Services: n/a    DISCHARGE PLAN: Explained Case Management role/services. Chart review completed. Pt is in the process of being admitted and is in isolation precautions for Covid-19. Called and spoke with pt's daughter Lucia Smith who completed the assessment. Pt's daughter stated pt owns a home in [place] and was recently at [facility] for 3 months. Lucia Smith had pt medically transported up here last week to live with her and she provides 24/7 care for pt. Pt is bed bound and she is working with the PCP office to get [facility]. Pt will need transportation home. Explained that transportation is not always covered by insurance and it may be private pay. Pt's daughter stated understanding as pt will need transport home. Explained CM would follow for possible needs. She stated understanding and denied current needs from CM. CM will continue to follow for needs on discharge. Please notify CM if needs or concerns arise.

## 2020-12-10 NOTE — CONSULTS
Patient is being seen at the request of Dr. Angela Orellana for a consultation for Covid    HISTORY OF PRESENT ILLNESS: This is a 68-year-old male with a history of diabetes and dementia who presented to the emergency department on 12/9/2020 with a 1 day history of shortness of breath and gurgling respirations noted by his family. On arrival, EMS placed patient on a nonrebreather for hypoxemia. Patient cannot provide additional history    PAST MEDICAL HISTORY:  Past Medical History:   Diagnosis Date    Arthritis     [facility]     Cerebral artery occlusion with cerebral infarction ([facility]     Congenital heart disease     COVID-19 12/09/2020    Type 2 diabetes mellitus without complication ([facility] [facility]      PAST SURGICAL HISTORY:  Past Surgical History:   Procedure Laterality Date    CARDIAC SURGERY      5 stints       FAMILY HISTORY:  Unable to obtain because the patient is non-communicative     SOCIAL HISTORY:   reports that he quit smoking about 34 years ago. His smoking use included cigarettes.  He has never used smokeless tobacco.    Scheduled Meds:   sodium chloride  20 mL Intravenous Once    sodium chloride flush  10 mL Intravenous 2 times per day    enoxaparin  30 mg Subcutaneous BID    finasteride  5 mg Oral Daily    folic acid  1 mg Oral Daily    levETIRAcetam  500 mg Oral BID    metoprolol succinate  12.5 mg Oral Daily    montelukast  10 mg Oral Nightly    prasugrel  5 mg Oral Daily    rosuvastatin  40 mg Oral Daily    sertraline  25 mg Oral Daily    tamsulosin  0.4 mg Oral Daily    methylPREDNISolone  40 mg Intravenous Q12H    [START ON 12/11/2020] remdesivir IVPB  100 mg Intravenous Q24H    famotidine  20 mg Oral BID     Continuous Infusions:   sodium chloride 75 mL/hr at 12/10/20 0258     PRN Meds:  sodium chloride flush, acetaminophen **OR** acetaminophen, polyethylene glycol, potassium chloride, magnesium sulfate, prochlorperazine, albuterol sulfate HFA **AND** ipratropium **AND** MDI changes    Chest imaging was reviewed by me and showed   CXR 12/9/2020 interstitial opacities    Head CT 12/9/2020 no acute intracranial abnormality    ASSESSMENT:  · Acute hypoxemic respiratory failure, normally not on oxygen  · COVID-19 pneumonia  · Hyponatremia  · Dementia -was acute onset, precipitated by trauma. He is nonambulatory at baseline. He is not oriented at baseline.   · H/O seizure d/o     PLAN:  COVID-19 isolation, droplet plus  Supplemental oxygen to maintain SaO2 >92%; wean as tolerated    Remdesevir D#2, LFT monitoring for high risk medication  CCP 1 U on 12/10/20   Steroid and #2, now Decadron 6  Inhaled bronchodilators only as needed, MDI preferred  · Patient is on Effient  · DNR-CCA, spouse told nurse no heroic measures, no machines, DNI

## 2020-12-10 NOTE — PLAN OF CARE
Pt was seen this morning by nocturnist physician. Please see H&P from 12/10/2020. Chart reviewed as below:    Vitals:  /70   Pulse 79   Temp 98.1 °F (36.7 °C)   Resp (!) 37   Ht 5' 10\" (1.778 m)   Wt 133 lb 1.6 oz (60.4 kg)   SpO2 99%   BMI 19.10 kg/m²     Labs:  Na 127, K 3.2, Cl 89, Cr 0.7, Mg 1.8, Glu 103  Serum OSM: 273, urine osm: 446  BNP: 582, Trop neg  ALT/AST normal,   WBC: normal, Hgb 12.1, Plt normal  UA with small blood    Rapid COVID test: POSITIVE    EKG:  Sinus rhythm with Premature atrial complex  slow amplitude P wave  Baseline artifact  Nonspecific ST and T wave abnormality  Prolonged QT rate of 499  Abnormal ECGConfirmed by AUSTIN ROJAS MD ([id number]) on 12/10/2020 7:20:17 AM     Microbiology:  Resp cx: pending    Imaging:    CT Head WO Contrast   Final Result   No acute intracranial abnormality. XR CHEST PORTABLE   Final Result   Increased interstitial markings nonspecific finding could be due to chronic   interstitial thickening, mild edema versus developing atypical infection. Plan:    COVID PNA  Acute Hypoxic Respiratory Failure  - CXR showed increased interstitial markings  - EMS reported O2 at 80% on RA  - Admitted to PCU, tele, droplet + precautions  - supp O2, wean as tolerated  - on Solumedrol, Remdesivir D#1, plan to transfuse CCP  - Pulm consulted    Hyponatremia  - Na 127 on arrival  - serum   - IVF, monitor  - repeat labs tomorrow    Hypokalemia  - K 3.2 today  - repleted  - repeat labs tomorrow    BPH  - cont Proscar and Flomax    GERD  - cont Pepcid    HTN  - stable  - cont BB    HLD  - cont Crestor    Seizure DO  - seizure precautions  - cont Keppra    Dementia  - supportive measures    Note: Pt is on Effient, limited medical hx available in chart.  Will cont here and discuss with patient tomorrow during rounding    DVT Prophylaxis: Lovenox  Diet: Clear Liquid  Code Status: Full code    Will assess pt tomorrow during daily rounds as he was seen this morning by valerio.    Marcelle Ayon PA-C 10:48 AM 12/10/2020

## 2020-12-10 NOTE — CONSULTS
Pharmacy to dose remdesivir per Dr. Pablo Cornelius. Patient is a 75 y/o M who tested positive for COVId-19 and currently on oxygen therapy. Remdesivir Day #1  WBC                BUN/SCr       CrCl                 ALT/AST           Tmax  6.8                   15/0.7              78                    24/33                98.1         Remdesivir 200 mg IVPB x1 dose today, followed by remdesivir 100 mg IVPB q24h x 4 doses for a total of 5 days of therapy.

## 2020-12-10 NOTE — PROGRESS NOTES
Pt is a poor historian and unable to answer admission questions. Pt's daughter, Etelvina Jones, called but there was no answer.

## 2020-12-10 NOTE — PROGRESS NOTES
differ): 88-89% on room air or greater than 92% on FiO2 28- 35% = 2    Peak Flow (asthma only): not applicable = 0    RSI: 5-6 = Q4hr PRN (every four hours as needed) for dyspnea        Plan       Goals: medication delivery    Patient/caregiver was educated on the proper method of use for Respiratory Care Devices:  No: /pt resting      Level of patient/caregiver understanding able to:   ? Verbalize understanding   ? Demonstrate understanding       ? Teach back        ? Needs reinforcement       ? No available caregiver               ? Other:     Response to education:  pt resting     Is patient being placed on Home Treatment Regimen? No     Does the patient have everything they need prior to discharge? NA     Comments: pt assessed and chart reviewed    Plan of Care: albuterol/atrovent prn    Electronically signed by Gabriel Burgos RCP on 12/10/2020 at 4:04 AM    Respiratory Protocol Guidelines     1. Assessment and treatment by Respiratory Therapy will be initiated for medication and therapeutic interventions upon initiation of aerosolized medication. 2. Physician will be contacted for respiratory rate (RR) greater than 35 breaths per minute. Therapy will be held for heart rate (HR) greater than 140 beats per minute, pending direction from physician. 3. Bronchodilators will be administered via Metered Dose Inhaler (MDI) with spacer when the following criteria are met:  a. Alert and cooperative     b. HR < 140 bpm  c. RR < 30 bpm                d. Can demonstrate a 2-3 second inspiratory hold  4. Bronchodilators will be administered via Hand Held Nebulizer [facility]) to patients when ANY of the following criteria are met  a. Incognizant or uncooperative          b. Patients treated with HHN at Home        c. Unable to demonstrate proper use of MDI with spacer     d. RR > 30 bpm   5.  Bronchodilators will be delivered via Metered Dose Inhaler (MDI), HHN, Aerogen to intubated patients on mechanical ventilation. 6. Inhalation medication orders will be delivered and/or substituted as outlined below. Aerosolized Medications Ordering and Administration Guidelines:    1. All Medications will be ordered by a physician, and their frequency and/or modality will be adjusted as defined by the patients Respiratory Severity Index (RSI) score. 2. If the patient does not have documented COPD, consider discontinuing anticholinergics when RSI is less than 9.  3. If the bronchospasm worsens (increased RSI), then the bronchodilator frequency can be increased to a maximum of every 4 hours. If greater than every 4 hours is required, the physician will be contacted. 4. If the bronchospasm improves, the frequency of the bronchodilator can be decreased, based on the patient's RSI, but not less than home treatment regimen frequency. 5. Bronchodilator(s) will be discontinued if patient has a RSI less than 9 and has received no scheduled or as needed treatment for 72  Hrs. Patients Ordered on a Mucolytic Agent:    1. Must always be administered with a bronchodilator. 2. Discontinue if patient experiences worsened bronchospasm, or secretions have lessened to the point that the patient is able to clear them with a cough. Anti-inflammatory and Combination Medications:    1. If the patient lacks prior history of lung disease, is not using inhaled anti-inflammatory medication at home, and lacks wheezing by examination or by history for at least 24 hours, contact physician for possible discontinuation.

## 2020-12-10 NOTE — PROGRESS NOTES
Spoke to pt's wife, Safia Cardenas. Admission questions completed at this time. Spoke about code status and she wishes for the patient to be a DNR-CCA. She states,\" He wouldn't want us to do all of that stuff\". Message sent to Hospitalist concerning code status.

## 2020-12-10 NOTE — ED PROVIDER NOTES
[address] ED      CHIEF COMPLAINT  Shortness of Breath (daughter called squad for sob.  daughter recently took pt out of nursing home. squad reports O2 in 80s on RA. Pt on 3 L NC now at 98%. Pt nonverbal, paperwork says hx of dementia/alz)       HISTORY OF PRESENT ILLNESS  Halley Armstrong is a 76 y.o. male with a past medical history of dementia and diabetes who presents to the ED complaining of shortness of breath. The patient is unable to provide a history. Per EMS, the patient started to become short of breath earlier today and daughter called EMS. He has a history of dementia. He recently moved from an [facility] in [place] and was transported here to live with his daughter. Patient is unable to provide a history I did call his daughter for more information. She states that the patient had been doing fine up until earlier today. She states he started to become short of breath and had \"gurgling\" respirations and therefore she called EMS. He was hypoxic on EMS arrival and placed on nonrebreather. She states that the patient has had a decline in his mental status over the past several months. She states that he was diagnosed with subdural hematomas this past September, and never had repeat CT. She denies any new trauma that she knows of. She states that he has been mumbling and slurring his speech over the past week and this has concerned her. However his mental status has been about baseline according to her. She denies that he has a history of COPD or CHF. No other complaints, modifying factors or associated symptoms. I have reviewed the following from the nursing documentation. Past Medical History:   Diagnosis Date    Congenital heart disease     Type 2 diabetes mellitus without complication ([facility] [address]      No past surgical history on file. No family history on file.   Social History     Socioeconomic History    Marital status:      Spouse name: Not on file    Number of children: Not on file    Years of education: Not on file    Highest education level: Not on file   Occupational History    Not on file   Social Needs    Financial resource strain: Not hard at all    Food insecurity     Worry: Never true     Inability: Never true   [employer] needs     Medical: No     Non-medical: No   Tobacco Use    Smoking status: Former Smoker     Types: Cigarettes     Last attempt to quit:      Years since quittin.9    Smokeless tobacco: Never Used   Substance and Sexual Activity    Alcohol use: Never     Frequency: Never    Drug use: Never    Sexual activity: Not Currently   Lifestyle    Physical activity     Days per week: Not on file     Minutes per session: Not on file    Stress: Not on file   Relationships    Social connections     Talks on phone: Not on file     Gets together: Not on file     Attends Mandaen service: Not on file     Active member of club or organization: Not on file     Attends meetings of clubs or organizations: Not on file     Relationship status: Not on file    Intimate partner violence     Fear of current or ex partner: Not on file     Emotionally abused: Not on file     Physically abused: Not on file     Forced sexual activity: Not on file   Other Topics Concern    Not on file   Social History Narrative    Not on file     No current facility-administered medications for this encounter.       Current Outpatient Medications   Medication Sig Dispense Refill    famotidine (PEPCID) 20 MG tablet Take 1 tablet by mouth 2 times daily 816 tablet 1    folic acid (FOLVITE) 1 MG tablet Take 1 tablet by mouth daily 30 tablet 2    dutasteride (AVODART) 0.5 MG capsule Take 1 capsule by mouth daily 30 capsule 3    tamsulosin (FLOMAX) 0.4 MG capsule Take 1 capsule by mouth daily 90 capsule 1    rosuvastatin (CRESTOR) 40 MG tablet Take 1 tablet by mouth daily 30 tablet 0    prasugrel (EFFIENT) 5 MG TABS Take 1 tablet by mouth daily 30 tablet 1 of 12/09/20   CBC Auto Differential   Result Value Ref Range    WBC 6.8 4.0 - 11.0 K/uL    RBC 3.82 (L) 4.20 - 5.90 M/uL    Hemoglobin 12.1 (L) 13.5 - 17.5 g/dL    Hematocrit 35.4 (L) 40.5 - 52.5 %    MCV 92.6 80.0 - 100.0 fL    MCH 31.7 26.0 - 34.0 pg    MCHC 34.2 31.0 - 36.0 g/dL    RDW 13.3 12.4 - 15.4 %    Platelets 544 215 - 112 K/uL    MPV 7.0 5.0 - 10.5 fL    Neutrophils % 74.5 %    Lymphocytes % 15.8 %    Monocytes % 8.6 %    Eosinophils % 0.7 %    Basophils % 0.4 %    Neutrophils Absolute 5.1 1.7 - 7.7 K/uL    Lymphocytes Absolute 1.1 1.0 - 5.1 K/uL    Monocytes Absolute 0.6 0.0 - 1.3 K/uL    Eosinophils Absolute 0.0 0.0 - 0.6 K/uL    Basophils Absolute 0.0 0.0 - 0.2 K/uL   Comprehensive Metabolic Panel w/ Reflex to MG   Result Value Ref Range    Sodium 127 (L) 136 - 145 mmol/L    Potassium reflex Magnesium 3.2 (L) 3.5 - 5.1 mmol/L    Chloride 89 (L) 99 - 110 mmol/L    CO2 30 21 - 32 mmol/L    Anion Gap 8 3 - 16    Glucose 103 (H) 70 - 99 mg/dL    BUN 15 7 - 20 mg/dL    CREATININE 0.7 (L) 0.8 - 1.3 mg/dL    GFR Non-African American >60 >60    GFR African American >60 >60    Calcium 8.4 8.3 - 10.6 mg/dL    Total Protein 6.1 (L) 6.4 - 8.2 g/dL    Alb 3.0 (L) 3.4 - 5.0 g/dL    Albumin/Globulin Ratio 1.0 (L) 1.1 - 2.2    Total Bilirubin 0.3 0.0 - 1.0 mg/dL    Alkaline Phosphatase 147 (H) 40 - 129 U/L    ALT 24 10 - 40 U/L    AST 33 15 - 37 U/L    Globulin 3.1 g/dL   Troponin   Result Value Ref Range    Troponin <0.01 <0.01 ng/mL   Lactic Acid, Plasma   Result Value Ref Range    Lactic Acid 0.9 0.4 - 2.0 mmol/L   Blood Gas, Venous   Result Value Ref Range    pH, Farhat 7.403 7.350 - 7.450    pCO2, [place]hat 46.7 40.0 - 50.0 mmHg    pO2, [place]hat 51.9 (H) 25.0 - 40.0 mmHg    HCO3, Venous 28.5 23.0 - 29.0 mmol/L    Base Excess, Farhat 3.1 (H) -3.0 - 3.0 mmol/L    O2 Sat, Farhat 87 Not Established %    Carboxyhemoglobin 1.4 0.0 - 1.5 %    MetHgb, Farhat 0.3 <1.5 %    TC02 (Calc), [place] 30 Not Established mmol/L    O2 Content, Far[place] 16 Not Established VOL %    O2 Therapy Unknown    COVID-19   Result Value Ref Range    SARS-CoV-2, NAAT DETECTED (AA) Not Detected   Magnesium   Result Value Ref Range    Magnesium 1.80 1.80 - 2.40 mg/dL   Brain Natriuretic Peptide   Result Value Ref Range    Pro- (H) 0 - 449 pg/mL   EKG 12 Lead   Result Value Ref Range    Ventricular Rate 78 BPM    Atrial Rate 78 BPM    P-R Interval 126 ms    QRS Duration 100 ms    Q-T Interval 438 ms    QTc Calculation (Bazett) 499 ms    R Axis 44 degrees    T Axis 61 degrees    Diagnosis       Sinus rhythm with Premature atrial complexesNonspecific ST and T wave abnormalityProlonged QTAbnormal ECGWhen compared with ECG of 09-DEC-2020 20:14, (unconfirmed)Sinus rhythm has replaced Junctional rhythmNonspecific T wave abnormality, improved in Inferior leadsT wave inversion less evident in Anterolateral leads       ECG  The Ekg interpreted by me shows  normal sinus rhythm with a rate of 78  Axis is   Normal  QTc is  within an acceptable range  Intervals and Durations are unremarkable. ST Segments: nonspecific changes  No prior for comparison     RADIOLOGY  Ct Head Wo Contrast    Result Date: 12/9/2020  EXAMINATION: CT OF THE HEAD WITHOUT CONTRAST  12/9/2020 7:11 pm TECHNIQUE: CT of the head was performed without the administration of intravenous contrast. Dose modulation, iterative reconstruction, and/or weight based adjustment of the mA/kV was utilized to reduce the radiation dose to as low as reasonably achievable. COMPARISON: None. HISTORY: ORDERING SYSTEM PROVIDED HISTORY: confusion, rt. sided facial droop TECHNOLOGIST PROVIDED HISTORY: Reason for exam:->confusion, rt. sided facial droop Has a \"code stroke\" or \"stroke alert\" been called? ->No Reason for Exam: Shortness of Breath (daughter called squad for sob. daughter recently took pt out of nursing home. squad reports O2 in 80s on RA. Pt on 3 L NC now at 98%.  Pt nonverbal, paperwork says hx of dementia/alz) FINDINGS: BRAIN/VENTRICLES:  No acute loss of the gray-white matter differentiation is identified to suggest acute or subacute infarct. No masses or hemorrhages within the brain parenchyma are found. No evidence of midline shift. There is mild periventricular low-attenuation, compatible with chronic small vessel ischemic disease. The intracranial vasculature, including the dural venous sinuses, is within normal limits. ORBITS: No acute orbital abnormalities are identified. SINUSES: Postsurgical changes are seen from previous partial ethmoidectomy and bilateral antrectomies. Mild mucosal thickening within the maxillary sinuses noted. SOFT TISSUES/SKULL: The calvarium is intact. Extracranial soft tissues are unremarkable. No acute intracranial abnormality. Xr Chest Portable    Result Date: 12/9/2020  EXAMINATION: ONE XRAY VIEW OF THE CHEST 12/9/2020 7:22 pm COMPARISON: None. HISTORY: ORDERING SYSTEM PROVIDED HISTORY: hypoxic TECHNOLOGIST PROVIDED HISTORY: Reason for exam:->hypoxic Reason for Exam: Shortness of Breath (daughter called squad for sob. daughter recently took pt out of nursing home. squad reports O2 in 80s on RA. Pt on 3 L NC now at 98%. Pt nonverbal, paperwork says hx of dementia/alz) FINDINGS: The cardiomediastinal silhouette is normal in size and contour. There are mildly increased interstitial markings are both lungs which are nonspecific may be chronic. No pleural effusion or pneumothorax is present. Increased interstitial markings nonspecific finding could be due to chronic interstitial thickening, mild edema versus developing atypical infection. ED COURSE/MDM  Patient seen and evaluated. Old records reviewed. Labs and imaging reviewed and results discussed with patient. Patient is a 66-year-old male presenting with shortness of breath. He is hypoxic in the high 80s on room air on arrival.  He is placed on 3 L nasal cannula and maintains oxygenation's in the mid 90s.   He normally does not wear oxygen. Otherwise he is afebrile and vital signs are within normal limits. Work-up is obtained. He is Covid positive on his rapid test.  Chest x-ray is negative for lobar pneumonia. Patient is treated with Decadron and I did cover him with doxycycline as well. Otherwise he is mildly hyponatremic at 127 likely due to dehydration and he is given fluids. He is hypokalemic at 3.2 and this was replaced as well. EKG is nonischemic and troponin is negative. CT head is negative for acute process this was ordered since the patient has had worsening mentation over the past few weeks per daughter. Patient will require admission for hypoxia from [facility]. I spoke with Dr. Casey Briseno who accepts. During the patient's ED course, the patient was given:  Medications   0.9 % sodium chloride bolus (0 mLs Intravenous Stopped 12/9/20 2147)   ipratropium-albuterol (DUONEB) nebulizer solution 1 ampule (1 ampule Inhalation Given 12/9/20 2043)   potassium chloride 10 mEq/100 mL IVPB (Peripheral Line) (0 mEq Intravenous Stopped 12/9/20 2208)   dexamethasone (PF) (DECADRON) injection 4 mg (4 mg Intravenous Given 12/9/20 2148)   doxycycline (VIBRAMYCIN) 100 mg in dextrose 5 % 100 mL IVPB (0 mg Intravenous Stopped 12/9/20 2328)        CLINICAL IMPRESSION  1. Acute respiratory failure with hypoxia ([facility] [address]    2. COVID-19    3. Hyponatremia    4. Hypokalemia    5. Dehydration        Blood pressure 129/71, pulse 72, temperature 98.1 °F (36.7 °C), temperature source Oral, resp. rate 28, height 5' 10\" (1.778 m), weight 133 lb 1.6 oz (60.4 kg), SpO2 100 %. Patient was given scripts for the following medications. I counseled patient how to take these medications. New Prescriptions    No medications on file       Follow-up with:  No follow-up provider specified. DISCLAIMER: This chart was created using Dragon dictation software.   Efforts were made by me to ensure accuracy, however some errors may be present due to

## 2020-12-10 NOTE — H&P
Hospital Medicine History & Physical      PCP: Juliane Culp MD    Date of Service: Pt seen/examined on 12/10/20 and admitted on 12/10/20 to Inpatient    Chief Complaint   Patient presents with    Shortness of Breath     daughter called karen for sob.  daughter recently took pt out of nursing home. karen reports O2 in 80s on RA. Pt on 3 L NC now at 98%. Pt nonverbal, paperwork says hx of dementia/alz       History Of Present Illness: The patient is a 76 y.o. male with PMH below, presents with SOB/STEIN. Patient is unable to provide history secondary to dementia. Reportedly the patient's daughter reported to ER staff that he had been increasingly short of breath since the a.m. He was found to be Covid positive in the ER. He has been requiring 2 to 3 L of oxygen since arrival.  He is not normally on O2. No additional history available at this time. Past Medical History:        Diagnosis Date    Congenital heart disease     Type 2 diabetes mellitus without complication ([facility] [address]        Past Surgical History:    No past surgical history on file. Medications Prior to Admission:    Prior to Admission medications    Medication Sig Start Date End Date Taking?  Authorizing Provider   famotidine (PEPCID) 20 MG tablet Take 1 tablet by mouth 2 times daily 12/9/20   Juliane Culp MD   folic acid (FOLVITE) 1 MG tablet Take 1 tablet by mouth daily 12/9/20   Juliane Culp MD   dutasteride (AVODART) 0.5 MG capsule Take 1 capsule by mouth daily 12/9/20   Juliane Culp MD   tamsulosin (FLOMAX) 0.4 MG capsule Take 1 capsule by mouth daily 12/9/20   Juliane uClp MD   rosuvastatin (CRESTOR) 40 MG tablet Take 1 tablet by mouth daily 12/9/20   Juliane Culp MD   prasugrel (EFFIENT) 5 MG TABS Take 1 tablet by mouth daily 12/9/20   Juliane Culp MD   nitrofurantoin, macrocrystal-monohydrate, (MACROBID) 100 MG capsule Take 100 mg by mouth 2 times daily    Historical Provider, MD   Insulin Syringe-Needle U-100 (INSULIN SYRINGE .3CC/31GX5/16\") 31G X 5/16\" 0.3 ML MISC 1 each by Does not apply route daily 12/8/20   Lane Dominguez MD   levETIRAcetam (KEPPRA) 500 MG tablet Take 1 tablet by mouth 2 times daily 12/8/20   Lane Dominguez MD   metFORMIN (GLUCOPHAGE) 500 MG tablet Take 1 tablet by mouth 2 times daily (with meals) 12/8/20   Lane Dominguez MD   montelukast (SINGULAIR) 10 MG tablet Take 1 tablet by mouth nightly 12/8/20   Lane Dominguez MD   sertraline (ZOLOFT) 25 MG tablet Take 1 tablet by mouth daily 12/8/20   Lane Dominguez MD   metoprolol succinate (TOPROL XL) 25 MG extended release tablet Take 0.5 tablets by mouth daily 12/8/20   Lane Dominguez MD   Blood Pressure KIT 1 kit by Does not apply route daily 12/8/20   Lane Dominguez MD       Allergies:  Pcn [penicillins]    Social History:    TOBACCO:   reports that he quit smoking about 34 years ago. His smoking use included cigarettes. He has never used smokeless tobacco.  ETOH:   reports no history of alcohol use. Family History:  Reviewed in detail and negative for DM, Early CAD, Cancer (except as below). Positive as follows:    No family history on file. REVIEW OF SYSTEMS:   Unable to obtain secondary to patient dementia. PHYSICAL EXAM PERFORMED:    /70   Pulse 80   Temp 98.1 °F (36.7 °C) (Oral)   Resp 28   Ht 5' 10\" (1.778 m)   Wt 133 lb 1.6 oz (60.4 kg)   SpO2 100%   BMI 19.10 kg/m²     GEN:  Confused, nonsensical speech, NAD.  cachexia  HEENT:  NC/AT,EOMI, dry MM, no erythema/exudates or visible masses. CVS:  Normal S1,S2. RRR. Without M/G/R.   LUNG:   Scattered exp wheezes w/o rales or rhonchi. Increased WOB w/ acc mm use. ABD:  Soft, ND/NT, BS+ x4. Without G/R.  EXT: 2+ pulses, no c/c/e. Brisk cap refill. PSY:  Thought process confused, affect mildly agitated. SHIRLEY:  CN III-XII intact, moves all 4 spontaneously, sensory grossly intact. SKIN: No rash or lesions on visible skin.     Chart review shows recent radiographs:  Ct Head Wo Contrast    Result Date: 12/9/2020  EXAMINATION: CT OF THE HEAD WITHOUT CONTRAST  12/9/2020 7:11 pm TECHNIQUE: CT of the head was performed without the administration of intravenous contrast. Dose modulation, iterative reconstruction, and/or weight based adjustment of the mA/kV was utilized to reduce the radiation dose to as low as reasonably achievable. COMPARISON: None. HISTORY: ORDERING SYSTEM PROVIDED HISTORY: confusion, rt. sided facial droop TECHNOLOGIST PROVIDED HISTORY: Reason for exam:->confusion, rt. sided facial droop Has a \"code stroke\" or \"stroke alert\" been called? ->No Reason for Exam: Shortness of Breath (daughter called squad for sob. daughter recently took pt out of nursing home. squad reports O2 in 80s on RA. Pt on 3 L NC now at 98%. Pt nonverbal, paperwork says hx of dementia/alz) FINDINGS: BRAIN/VENTRICLES:  No acute loss of the gray-white matter differentiation is identified to suggest acute or subacute infarct. No masses or hemorrhages within the brain parenchyma are found. No evidence of midline shift. There is mild periventricular low-attenuation, compatible with chronic small vessel ischemic disease. The intracranial vasculature, including the dural venous sinuses, is within normal limits. ORBITS: No acute orbital abnormalities are identified. SINUSES: Postsurgical changes are seen from previous partial ethmoidectomy and bilateral antrectomies. Mild mucosal thickening within the maxillary sinuses noted. SOFT TISSUES/SKULL: The calvarium is intact. Extracranial soft tissues are unremarkable. No acute intracranial abnormality. Xr Chest Portable    Result Date: 12/9/2020  EXAMINATION: ONE XRAY VIEW OF THE CHEST 12/9/2020 7:22 pm COMPARISON: None. HISTORY: ORDERING SYSTEM PROVIDED HISTORY: hypoxic TECHNOLOGIST PROVIDED HISTORY: Reason for exam:->hypoxic Reason for Exam: Shortness of Breath (daughter called squad for sob. daughter recently took pt out of nursing home.  squad reports O2 in 80s on RA. Pt on 3 L NC now at 98%. Pt nonverbal, paperwork says hx of dementia/alz) FINDINGS: The cardiomediastinal silhouette is normal in size and contour. There are mildly increased interstitial markings are both lungs which are nonspecific may be chronic. No pleural effusion or pneumothorax is present. Increased interstitial markings nonspecific finding could be due to chronic interstitial thickening, mild edema versus developing atypical infection. EKG 12 Lead [[id number]]      Collected: 12/09/20 2016     Updated: 12/10/20 0720      Ventricular Rate  78  BPM     Atrial Rate  78  BPM     P-R Interval  126  ms     QRS Duration  100  ms     Q-T Interval  438  ms     QTc Calculation (Bazett)  499  ms     R Axis  44  degrees     T Axis  61  degrees     Diagnosis  Sinus rhythm with Premature atrial complexeslow amplitude P waveBaseline artifactNonspecific ST and T wave abnormalityProlonged QTAbnormal ECGConfirmed by AUSTIN ROJAS MD ([id number]) on 12/10/2020 7:20:17 AM          CBC:  Recent Labs     12/09/20 1910   WBC 6.8   HGB 12.1*   HCT 35.4*           RENAL  Recent Labs     12/09/20 1910   *   K 3.2*   CL 89*   CO2 30   BUN 15   CREATININE 0.7*   GLUCOSE 103*       Hemoglobin a1c:  No results found for: LABA1C    LFT'S:  Recent Labs     12/09/20 1910   AST 33   ALT 24   BILITOT 0.3   ALKPHOS 147*     CARDIAC ENZYMES:   Recent Labs     12/09/20 1910   TROPONINI <0.01     Lab Results   Component Value Date    PROBNP 582 (H) 12/09/2020       LACTIC ACID:  Recent Labs     12/09/20 1910   LACTA 0.9     VBG:  Recent Labs     12/09/20 1910   PHVEN 7.403   CQK4FBH 46.7   XCT1SUC 28.5   PO2VEN 51.9*   [id number] 87        PHYSICIAN CERTIFICATION  I certify that Robert Leal is expected to be hospitalized for 2 midnights based on the following assessment and plan:    ASSESSMENT/PLAN:  1.  Acute respiratory failure with hypoxia, likely related to #2, supplemental O2 to maintain SPO2 ? 92%, continuous pulse ox. Patient is requiring 2 to 3 L of O2. He is not normally on O2 at home. Wean as tolerated. Pulm c/s.   2. COVID PNA, remdesivir, CCP, solumedrol, droplet +. 3. Hyponatremia, 127, suspect related to hypovolemia. Chk osmos and repeat in am.    4. Hypokalemia, 3.2, replacement protocol. 5. Dementia, supportive measures. 6. BPH, continue home regimen. 7. GERD, continue home regimen. 8. HLD, continue home regimen. 9. History of seizure disorder, continue home regimen. DVT Prophylaxis: intermediate dose Lx  Diet: clears  Code Status: DNR-CCA  PT/OT Eval Status: Will order if needed and as patient condition allows  Dispo - Admit to inpatient     Abeba Howard MD    Thank you Mildred Castleman, MD for the opportunity to be involved in this patient's care. If you have any questions or concerns please feel free to contact me via the [employer] at [phone]. This chart was generated using the [address] [place], [place] [address]  dictation system. I created this record but it may contain dictation errors given the limitations of this technology.

## 2020-12-11 LAB
A/G RATIO: 1 (ref 1.1–2.2)
ALBUMIN SERPL-MCNC: 2.8 G/DL (ref 3.4–5)
ALP BLD-CCNC: 130 U/L (ref 40–129)
ALT SERPL-CCNC: 21 U/L (ref 10–40)
ANION GAP SERPL CALCULATED.3IONS-SCNC: 8 MMOL/L (ref 3–16)
AST SERPL-CCNC: 27 U/L (ref 15–37)
BASOPHILS ABSOLUTE: 0 K/UL (ref 0–0.2)
BASOPHILS RELATIVE PERCENT: 0.3 %
BILIRUB SERPL-MCNC: 0.3 MG/DL (ref 0–1)
BUN BLDV-MCNC: 13 MG/DL (ref 7–20)
CALCIUM SERPL-MCNC: 8.4 MG/DL (ref 8.3–10.6)
CHLORIDE BLD-SCNC: 95 MMOL/L (ref 99–110)
CO2: 29 MMOL/L (ref 21–32)
CREAT SERPL-MCNC: 0.6 MG/DL (ref 0.8–1.3)
EOSINOPHILS ABSOLUTE: 0 K/UL (ref 0–0.6)
EOSINOPHILS RELATIVE PERCENT: 0 %
FERRITIN: 881.2 NG/ML (ref 30–400)
FOLATE: 16.66 NG/ML (ref 4.78–24.2)
GFR AFRICAN AMERICAN: >60
GFR NON-AFRICAN AMERICAN: >60
GLOBULIN: 2.9 G/DL
GLUCOSE BLD-MCNC: 86 MG/DL (ref 70–99)
HCT VFR BLD CALC: 31.9 % (ref 40.5–52.5)
HEMOGLOBIN: 10.9 G/DL (ref 13.5–17.5)
IRON SATURATION: 27 % (ref 20–50)
IRON: 55 UG/DL (ref 59–158)
LYMPHOCYTES ABSOLUTE: 1.7 K/UL (ref 1–5.1)
LYMPHOCYTES RELATIVE PERCENT: 17 %
MAGNESIUM: 1.6 MG/DL (ref 1.8–2.4)
MCH RBC QN AUTO: 32.2 PG (ref 26–34)
MCHC RBC AUTO-ENTMCNC: 34.1 G/DL (ref 31–36)
MCV RBC AUTO: 94.2 FL (ref 80–100)
MONOCYTES ABSOLUTE: 0.7 K/UL (ref 0–1.3)
MONOCYTES RELATIVE PERCENT: 6.6 %
NEUTROPHILS ABSOLUTE: 7.7 K/UL (ref 1.7–7.7)
NEUTROPHILS RELATIVE PERCENT: 76.1 %
PDW BLD-RTO: 14.2 % (ref 12.4–15.4)
PLATELET # BLD: 385 K/UL (ref 135–450)
PMV BLD AUTO: 7.2 FL (ref 5–10.5)
POTASSIUM REFLEX MAGNESIUM: 3.3 MMOL/L (ref 3.5–5.1)
RBC # BLD: 3.39 M/UL (ref 4.2–5.9)
SODIUM BLD-SCNC: 132 MMOL/L (ref 136–145)
TOTAL IRON BINDING CAPACITY: 206 UG/DL (ref 260–445)
TOTAL PROTEIN: 5.7 G/DL (ref 6.4–8.2)
VITAMIN B-12: 1498 PG/ML (ref 211–911)
WBC # BLD: 10.2 K/UL (ref 4–11)

## 2020-12-11 PROCEDURE — 6370000000 HC RX 637 (ALT 250 FOR IP): Performed by: INTERNAL MEDICINE

## 2020-12-11 PROCEDURE — 6360000002 HC RX W HCPCS: Performed by: INTERNAL MEDICINE

## 2020-12-11 PROCEDURE — 6360000002 HC RX W HCPCS: Performed by: PHYSICIAN ASSISTANT

## 2020-12-11 PROCEDURE — 80053 COMPREHEN METABOLIC PANEL: CPT

## 2020-12-11 PROCEDURE — 2060000000 HC ICU INTERMEDIATE R&B

## 2020-12-11 PROCEDURE — 82746 ASSAY OF FOLIC ACID SERUM: CPT

## 2020-12-11 PROCEDURE — 82607 VITAMIN B-12: CPT

## 2020-12-11 PROCEDURE — 83540 ASSAY OF IRON: CPT

## 2020-12-11 PROCEDURE — 6370000000 HC RX 637 (ALT 250 FOR IP): Performed by: PHYSICIAN ASSISTANT

## 2020-12-11 PROCEDURE — 82728 ASSAY OF FERRITIN: CPT

## 2020-12-11 PROCEDURE — 99233 SBSQ HOSP IP/OBS HIGH 50: CPT | Performed by: INTERNAL MEDICINE

## 2020-12-11 PROCEDURE — 2580000003 HC RX 258: Performed by: INTERNAL MEDICINE

## 2020-12-11 PROCEDURE — 83550 IRON BINDING TEST: CPT

## 2020-12-11 PROCEDURE — 36415 COLL VENOUS BLD VENIPUNCTURE: CPT

## 2020-12-11 PROCEDURE — 85025 COMPLETE CBC W/AUTO DIFF WBC: CPT

## 2020-12-11 PROCEDURE — 83735 ASSAY OF MAGNESIUM: CPT

## 2020-12-11 PROCEDURE — 2500000003 HC RX 250 WO HCPCS: Performed by: INTERNAL MEDICINE

## 2020-12-11 RX ORDER — DONEPEZIL HYDROCHLORIDE 23 MG/1
23 TABLET, FILM COATED ORAL NIGHTLY
COMMUNITY

## 2020-12-11 RX ORDER — MAGNESIUM SULFATE IN WATER 40 MG/ML
2 INJECTION, SOLUTION INTRAVENOUS ONCE
Status: COMPLETED | OUTPATIENT
Start: 2020-12-11 | End: 2020-12-11

## 2020-12-11 RX ORDER — DONEPEZIL HYDROCHLORIDE 23 MG/1
23 TABLET, FILM COATED ORAL NIGHTLY
Status: DISCONTINUED | OUTPATIENT
Start: 2020-12-11 | End: 2020-12-14 | Stop reason: HOSPADM

## 2020-12-11 RX ORDER — TRAMADOL HYDROCHLORIDE 50 MG/1
50 TABLET ORAL EVERY 6 HOURS PRN
Status: DISCONTINUED | OUTPATIENT
Start: 2020-12-11 | End: 2020-12-14 | Stop reason: HOSPADM

## 2020-12-11 RX ADMIN — ENOXAPARIN SODIUM 30 MG: 30 INJECTION, SOLUTION INTRAVENOUS; SUBCUTANEOUS at 20:58

## 2020-12-11 RX ADMIN — TAMSULOSIN HYDROCHLORIDE 0.4 MG: 0.4 CAPSULE ORAL at 09:59

## 2020-12-11 RX ADMIN — DEXAMETHASONE SODIUM PHOSPHATE 6 MG: 4 INJECTION, SOLUTION INTRAMUSCULAR; INTRAVENOUS at 10:48

## 2020-12-11 RX ADMIN — POTASSIUM CHLORIDE 10 MEQ: 7.46 INJECTION, SOLUTION INTRAVENOUS at 15:14

## 2020-12-11 RX ADMIN — POTASSIUM CHLORIDE 10 MEQ: 7.46 INJECTION, SOLUTION INTRAVENOUS at 11:46

## 2020-12-11 RX ADMIN — DONEPEZIL HYDROCHLORIDE 23 MG: 23 TABLET, FILM COATED ORAL at 20:58

## 2020-12-11 RX ADMIN — ENOXAPARIN SODIUM 30 MG: 30 INJECTION, SOLUTION INTRAVENOUS; SUBCUTANEOUS at 09:58

## 2020-12-11 RX ADMIN — POTASSIUM CHLORIDE 10 MEQ: 7.46 INJECTION, SOLUTION INTRAVENOUS at 14:12

## 2020-12-11 RX ADMIN — FINASTERIDE 5 MG: 5 TABLET, FILM COATED ORAL at 09:58

## 2020-12-11 RX ADMIN — MAGNESIUM SULFATE IN WATER 2 G: 40 INJECTION, SOLUTION INTRAVENOUS at 11:46

## 2020-12-11 RX ADMIN — PRASUGREL 5 MG: 5 TABLET, FILM COATED ORAL at 09:58

## 2020-12-11 RX ADMIN — ROSUVASTATIN CALCIUM 40 MG: 10 TABLET, FILM COATED ORAL at 09:59

## 2020-12-11 RX ADMIN — LEVETIRACETAM 500 MG: 500 TABLET ORAL at 20:58

## 2020-12-11 RX ADMIN — FAMOTIDINE 20 MG: 20 TABLET ORAL at 09:59

## 2020-12-11 RX ADMIN — SERTRALINE HYDROCHLORIDE 25 MG: 50 TABLET ORAL at 09:59

## 2020-12-11 RX ADMIN — MONTELUKAST SODIUM 10 MG: 10 TABLET, COATED ORAL at 20:58

## 2020-12-11 RX ADMIN — Medication 10 ML: at 20:59

## 2020-12-11 RX ADMIN — LEVETIRACETAM 500 MG: 500 TABLET ORAL at 09:59

## 2020-12-11 RX ADMIN — FOLIC ACID 1 MG: 1 TABLET ORAL at 09:59

## 2020-12-11 RX ADMIN — REMDESIVIR 100 MG: 100 INJECTION, POWDER, LYOPHILIZED, FOR SOLUTION INTRAVENOUS at 05:09

## 2020-12-11 RX ADMIN — FAMOTIDINE 20 MG: 20 TABLET ORAL at 20:58

## 2020-12-11 RX ADMIN — POTASSIUM CHLORIDE 10 MEQ: 7.46 INJECTION, SOLUTION INTRAVENOUS at 10:48

## 2020-12-11 RX ADMIN — METOPROLOL SUCCINATE 12.5 MG: 25 TABLET, FILM COATED, EXTENDED RELEASE ORAL at 09:58

## 2020-12-11 NOTE — CARE COORDINATION
INTERDISCIPLINARY PLAN OF CARE CONFERENCE    Date/Time: 12/11/2020 11:16 AM  Completed by: Beverley Fothergill, Case Management      Patient Name:  Regi Ramirez  YOB: 1945  Admitting Diagnosis: Acute respiratory failure with hypoxia ([address] [J96.01]  Acute respiratory failure with hypoxia ([address] [J96.01]     Admit Date/Time:  12/9/2020  6:40 PM    Chart reviewed. Interdisciplinary team contacted or reviewed plan related to patient progress and discharge plans. Disciplines included Case Management, Nursing, and Dietitian. Current Status:  Inpatient  PT/OT recommendation for discharge plan of care: n/a    Expected D/C Disposition:  To daughter's home where he has been living  Confirmed plan with patient and/or family Yes confirmed with: Kelsey Hernandez    Discharge Plan Comments:  Chart reviewed. Patient plans to return to his daughter's home where he has been living. Will likely need hhc and possible oxygen. CM following.     Home O2 in place on admit: No  Pt informed of need to bring portable home O2 tank on day of discharge for nursing to connect prior to leaving:  No  Verbalized agreement/Understanding:  Not Indicated

## 2020-12-11 NOTE — PROGRESS NOTES
Progress Note    Admit Date:  12/9/2020    Subjective:  Mr. Alina Freeman was seen in bed. Moaning in bed. Oriented to self only. Denies any pain. Spoke with daughter who reports pt suffered a fall in July while in [place]. Prior to the fall, pt was oriented x 3. After the fall, pt had increasing confusion and abnormal behavior that progressed. He was taken to the hospital and diagnosed with an [address]. He went to SNF following discharge. He then moved here to [place] to be with daughter. His spouse lives in [place]. Plan is for pt to go home to daughter in [place]. Remains oriented to self only but family reports memory has improved gradually since rehab stay. Objective:   Patient Vitals for the past 4 hrs:   BP Temp Temp src Pulse Resp SpO2   12/11/20 0719 134/75 97 °F (36.1 °C) Axillary 75 21 99 %            Intake/Output Summary (Last 24 hours) at 12/11/2020 1047  Last data filed at 12/11/2020 0719  Gross per 24 hour   Intake 2209 ml   Output --   Net 2209 ml       Physical Exam:  Gen: Elderly  male, resting in bed, intermittently moaning, in soft restraints   Eyes: No sclera icterus. No conjunctival injection. Resp: No accessory muscle use. No crackles. No wheezes. No rhonchi. On 3L - limited exam as pt in restraints  CV: Regular rate. Regular rhythm. No murmur. No rub. No edema. GI: Soft, Non-tender. Non-distended. Normal bowel sounds. Skin: Warm and dry. No rash on exposed extremities. M/S: Muscle wasting to BLE  Neuro: Awake.  Grossly nonfocal, moves extremities symmetrically, speech is muffled - pt mumbles incoherently  Psych: Oriented x 1 - self only    Scheduled Meds:   sodium chloride  20 mL Intravenous Once    sodium chloride flush  10 mL Intravenous 2 times per day    enoxaparin  30 mg Subcutaneous BID    finasteride  5 mg Oral Daily    folic acid  1 mg Oral Daily    levETIRAcetam  500 mg Oral BID    metoprolol succinate  12.5 mg Oral Daily    montelukast  10 mg Oral Nightly    this time  - carb control diet       BPH  - cont Proscar and Flomax     GERD  - cont Pepcid     HTN  - stable  - cont BB     HLD  - cont Crestor    CAD  - s/p stents  - seen in [place]  - no c/o pain, Trop neg  - on Effient, Toprol XL, Crestor     Hx of ICH  - in September of 2020 in [place]  - has OP f/u with neurology here in [place]  - CT head with no acute findings  - seizure precautions  - on Keppra      Dementia  - memory has been poor 2/2 ICH but has slowly been improving  - supportive measures  - cont Donepezil, Zoloft    Normocytic Anemia  - Hgb 12.1 -> 10.9  - no priors for comparison  - check iron studies, B12 and folate  - will monitor    DVT Prophylaxis: Lovenox  Diet: DIET CLEAR LIQUID;  Code Status: DNR-CCA    D/C IVF - BP stable, Cr normal, tolerating PO/no emesis    Will need PT/OT ordered prior to d/c when appropriate    Dispo: cont care    Ramiro Gore PA-C 12:02 PM 12/11/2020

## 2020-12-11 NOTE — PROGRESS NOTES
Pulmonary Progress Note    CC: Hypoxemia, shortness of breath, Covid    Subjective: Unable to obtain because the patient is non-communicative     IV line: Peripheral      Intake/Output Summary (Last 24 hours) at 12/11/2020 1414  Last data filed at 12/11/2020 1202  Gross per 24 hour   Intake 2431 ml   Output --   Net 2431 ml       Exam:   /75   Pulse 75   Temp 97 °F (36.1 °C) (Axillary)   Resp 21   Ht 5' 10\" (1.778 m)   Wt 133 lb 1.6 oz (60.4 kg)   SpO2 99%   BMI 19.10 kg/m²  on 3 L  Gen: NAD, very restless in bed though. Eyes: PERRL. No sclera icterus. No conjunctival injection. ENT: No discharge. Pharynx clear. Neck: Trachea midline. Normal thyroid. Resp: No accessory muscle use. No crackles. No wheezes. No rhonchi. No dullness on percussion. CV: Regular rate. Regular rhythm. No murmur or rub. No edema. GI: Non-tender. Non-distended. No masses. No organomegaly. Normal bowel sounds. No hernia. Skin: Warm and dry. No nodule on exposed extremities. No rash on exposed extremities. Lymph: No cervical LAD. No supraclavicular LAD. M/S: No cyanosis. No joint deformity. No clubbing. Neuro: Awake. Moves all extremities. CN grossly intact. Psych: Unable to obtain because the patient is non-communicative .      Scheduled Meds:   [START ON 12/12/2020] sertraline  50 mg Oral Daily    donepezil  23 mg Oral Nightly    sodium chloride  20 mL Intravenous Once    sodium chloride flush  10 mL Intravenous 2 times per day    enoxaparin  30 mg Subcutaneous BID    finasteride  5 mg Oral Daily    folic acid  1 mg Oral Daily    levETIRAcetam  500 mg Oral BID    metoprolol succinate  12.5 mg Oral Daily    montelukast  10 mg Oral Nightly    prasugrel  5 mg Oral Daily    rosuvastatin  40 mg Oral Daily    tamsulosin  0.4 mg Oral Daily    remdesivir IVPB  100 mg Intravenous Q24H    famotidine  20 mg Oral BID    dexamethasone  6 mg Intravenous Daily     Continuous Infusions:    PRN Meds:  sodium chloride flush, acetaminophen **OR** acetaminophen, polyethylene glycol, potassium chloride, magnesium sulfate, prochlorperazine, albuterol sulfate HFA **AND** ipratropium **AND** MDI Treatment    Labs:  CBC:   Recent Labs     12/09/20 1910 12/11/20  0338   WBC 6.8 10.2   HGB 12.1* 10.9*   HCT 35.4* 31.9*   MCV 92.6 94.2    385     BMP:   Recent Labs     12/09/20 1910 12/11/20  0338   * 132*   K 3.2* 3.3*   CL 89* 95*   CO2 30 29   BUN 15 13   CREATININE 0.7* 0.6*     LIVER PROFILE:   Recent Labs     12/09/20 1910 12/11/20  0338   AST 33 27   ALT 24 21   BILITOT 0.3 0.3   ALKPHOS 147* 130*     PT/INR: No results for input(s): PROTIME, INR in the last 72 hours. APTT: No results for input(s): APTT in the last 72 hours. UA:  Recent Labs     12/10/20  0445   COLORU Yellow   PHUR 6.0   WBCUA 3-5   RBCUA 11-20*   BACTERIA Rare*   CLARITYU Clear   SPECGRAV 1.020   LEUKOCYTESUR Negative   UROBILINOGEN 0.2   BILIRUBINUR Negative   BLOODU SMALL*   GLUCOSEU Negative     No results for input(s): PHART, XFL0MRZ, PO2ART in the last 72 hours. Cultures:   12/9/2020 SARS-CoV-2 positive    Films:  CXR 12/9/2020 interstitial opacities    Head CT 12/9/2020 no acute intracranial abnormality    ASSESSMENT:  · Acute hypoxemic respiratory failure, normally not on oxygen  · COVID-19 pneumonia  · Hyponatremia  · Dementia -was acute onset, precipitated by trauma. He is nonambulatory at baseline. He is not oriented at baseline.   · H/O seizure d/o     PLAN:  COVID-19 isolation, droplet plus  Supplemental oxygen to maintain SaO2 >92%; wean as tolerated    Remdesevir D#3, LFT monitoring for high risk medication  CCP 1 U on 12/10/20   Steroid D#3, now Decadron 6  Inhaled bronchodilators only as needed, MDI preferred  · Patient is on Effient  · DNR-CCA, spouse told nurse no heroic measures, no machines, DNI

## 2020-12-11 NOTE — PROGRESS NOTES
4 Eyes Skin Assessment     The patient is being assess for   Admission    I agree that 2 RN's have performed a thorough Head to Toe Skin Assessment on the patient. ALL assessment sites listed below have been assessed. Areas assessed by both nurses:   [x]   Head, Face, and Ears   [x]   Shoulders, Back, and Chest, Abdomen  [x]   Arms, Elbows, and Hands   [x]   Coccyx, Sacrum, and Ischium  [x]   Legs, Feet, and Heels        Pt has preventative Mepilex to sacrum Scattered bruising and abrasions. **SHARE this note so that the co-signing nurse is able to place an eSignature**    Co-signer eSignature: Electronically signed by Zee Cutler RN on 12/14/20 at 8:08 PM EST    Does the Patient have Skin Breakdown? No          Artur Prevention initiated:  No   Wound Care Orders initiated:  No      [facility] nurse consulted for Pressure Injury (Stage 3,4, Unstageable, DTI, NWPT, Complex wounds)and New or Established Ostomies:  No      Primary Nurse eSignature: Electronically signed by Zee Cutler RN on 12/11/20 at 3:05 AM EST    Patient is not able to demonstrate the ability to move from a reclining position to an upright position within the recliner due to confusion and weakness.

## 2020-12-11 NOTE — PROGRESS NOTES
Pt awake all night. Turned and repositioned Q 2 hours. Pt remains on monitor and pulse oxy continuously.

## 2020-12-11 NOTE — PROGRESS NOTES
AM assessment completed. Scheduled medications given per MAR. VSS on 3 lpm. A/O to self only. Denies any needs, call light in reach. Will monitor. Bilat wrist restraints d/t pulling at lines and tubes. Changed and repositioned.

## 2020-12-12 LAB
A/G RATIO: 0.9 (ref 1.1–2.2)
ALBUMIN SERPL-MCNC: 2.9 G/DL (ref 3.4–5)
ALBUMIN SERPL-MCNC: 2.9 G/DL (ref 3.4–5)
ALP BLD-CCNC: 142 U/L (ref 40–129)
ALP BLD-CCNC: 145 U/L (ref 40–129)
ALT SERPL-CCNC: 25 U/L (ref 10–40)
ALT SERPL-CCNC: 25 U/L (ref 10–40)
ANION GAP SERPL CALCULATED.3IONS-SCNC: 14 MMOL/L (ref 3–16)
ANION GAP SERPL CALCULATED.3IONS-SCNC: 7 MMOL/L (ref 3–16)
AST SERPL-CCNC: 29 U/L (ref 15–37)
AST SERPL-CCNC: 31 U/L (ref 15–37)
BASOPHILS ABSOLUTE: 0 K/UL (ref 0–0.2)
BASOPHILS RELATIVE PERCENT: 0.3 %
BILIRUB SERPL-MCNC: 0.3 MG/DL (ref 0–1)
BILIRUB SERPL-MCNC: <0.2 MG/DL (ref 0–1)
BILIRUBIN DIRECT: <0.2 MG/DL (ref 0–0.3)
BILIRUBIN, INDIRECT: ABNORMAL MG/DL (ref 0–1)
BUN BLDV-MCNC: 11 MG/DL (ref 7–20)
BUN BLDV-MCNC: 12 MG/DL (ref 7–20)
CALCIUM SERPL-MCNC: 8.1 MG/DL (ref 8.3–10.6)
CALCIUM SERPL-MCNC: 8.5 MG/DL (ref 8.3–10.6)
CHLORIDE BLD-SCNC: 93 MMOL/L (ref 99–110)
CHLORIDE BLD-SCNC: 97 MMOL/L (ref 99–110)
CO2: 18 MMOL/L (ref 21–32)
CO2: 27 MMOL/L (ref 21–32)
CREAT SERPL-MCNC: 0.6 MG/DL (ref 0.8–1.3)
CREAT SERPL-MCNC: 0.6 MG/DL (ref 0.8–1.3)
EOSINOPHILS ABSOLUTE: 0 K/UL (ref 0–0.6)
EOSINOPHILS RELATIVE PERCENT: 0.3 %
GFR AFRICAN AMERICAN: >60
GFR AFRICAN AMERICAN: >60
GFR NON-AFRICAN AMERICAN: >60
GFR NON-AFRICAN AMERICAN: >60
GLOBULIN: 3.1 G/DL
GLUCOSE BLD-MCNC: 115 MG/DL (ref 70–99)
GLUCOSE BLD-MCNC: 122 MG/DL (ref 70–99)
GLUCOSE BLD-MCNC: 86 MG/DL (ref 70–99)
GLUCOSE BLD-MCNC: 98 MG/DL (ref 70–99)
HCT VFR BLD CALC: 35.9 % (ref 40.5–52.5)
HEMOGLOBIN: 11.8 G/DL (ref 13.5–17.5)
LYMPHOCYTES ABSOLUTE: 2.2 K/UL (ref 1–5.1)
LYMPHOCYTES RELATIVE PERCENT: 24.3 %
MAGNESIUM: 1.8 MG/DL (ref 1.8–2.4)
MCH RBC QN AUTO: 31.7 PG (ref 26–34)
MCHC RBC AUTO-ENTMCNC: 32.8 G/DL (ref 31–36)
MCV RBC AUTO: 96.5 FL (ref 80–100)
MONOCYTES ABSOLUTE: 0.5 K/UL (ref 0–1.3)
MONOCYTES RELATIVE PERCENT: 5.8 %
NEUTROPHILS ABSOLUTE: 6.4 K/UL (ref 1.7–7.7)
NEUTROPHILS RELATIVE PERCENT: 69.3 %
PDW BLD-RTO: 14.3 % (ref 12.4–15.4)
PERFORMED ON: ABNORMAL
PERFORMED ON: NORMAL
PLATELET # BLD: 364 K/UL (ref 135–450)
PMV BLD AUTO: 7 FL (ref 5–10.5)
POTASSIUM REFLEX MAGNESIUM: 3.8 MMOL/L (ref 3.5–5.1)
POTASSIUM SERPL-SCNC: 3.9 MMOL/L (ref 3.5–5.1)
RBC # BLD: 3.72 M/UL (ref 4.2–5.9)
SODIUM BLD-SCNC: 127 MMOL/L (ref 136–145)
SODIUM BLD-SCNC: 129 MMOL/L (ref 136–145)
TOTAL PROTEIN: 6 G/DL (ref 6.4–8.2)
TOTAL PROTEIN: 6.2 G/DL (ref 6.4–8.2)
WBC # BLD: 9.2 K/UL (ref 4–11)

## 2020-12-12 PROCEDURE — 99233 SBSQ HOSP IP/OBS HIGH 50: CPT | Performed by: INTERNAL MEDICINE

## 2020-12-12 PROCEDURE — 80053 COMPREHEN METABOLIC PANEL: CPT

## 2020-12-12 PROCEDURE — 94761 N-INVAS EAR/PLS OXIMETRY MLT: CPT

## 2020-12-12 PROCEDURE — 6370000000 HC RX 637 (ALT 250 FOR IP): Performed by: PHYSICIAN ASSISTANT

## 2020-12-12 PROCEDURE — 6360000002 HC RX W HCPCS: Performed by: INTERNAL MEDICINE

## 2020-12-12 PROCEDURE — 6370000000 HC RX 637 (ALT 250 FOR IP): Performed by: INTERNAL MEDICINE

## 2020-12-12 PROCEDURE — 6370000000 HC RX 637 (ALT 250 FOR IP): Performed by: NURSE PRACTITIONER

## 2020-12-12 PROCEDURE — 2500000003 HC RX 250 WO HCPCS: Performed by: INTERNAL MEDICINE

## 2020-12-12 PROCEDURE — 83735 ASSAY OF MAGNESIUM: CPT

## 2020-12-12 PROCEDURE — 2060000000 HC ICU INTERMEDIATE R&B

## 2020-12-12 PROCEDURE — 36415 COLL VENOUS BLD VENIPUNCTURE: CPT

## 2020-12-12 PROCEDURE — 2580000003 HC RX 258: Performed by: INTERNAL MEDICINE

## 2020-12-12 PROCEDURE — 2700000000 HC OXYGEN THERAPY PER DAY

## 2020-12-12 PROCEDURE — 99232 SBSQ HOSP IP/OBS MODERATE 35: CPT | Performed by: PHYSICIAN ASSISTANT

## 2020-12-12 PROCEDURE — 85025 COMPLETE CBC W/AUTO DIFF WBC: CPT

## 2020-12-12 RX ORDER — DEXAMETHASONE SODIUM PHOSPHATE 10 MG/ML
6 INJECTION, SOLUTION INTRAMUSCULAR; INTRAVENOUS DAILY
Status: DISCONTINUED | OUTPATIENT
Start: 2020-12-12 | End: 2020-12-14 | Stop reason: HOSPADM

## 2020-12-12 RX ADMIN — ENOXAPARIN SODIUM 30 MG: 30 INJECTION, SOLUTION INTRAVENOUS; SUBCUTANEOUS at 21:43

## 2020-12-12 RX ADMIN — Medication 10 ML: at 21:41

## 2020-12-12 RX ADMIN — LEVETIRACETAM 500 MG: 500 TABLET ORAL at 08:23

## 2020-12-12 RX ADMIN — SERTRALINE HYDROCHLORIDE 50 MG: 50 TABLET ORAL at 08:23

## 2020-12-12 RX ADMIN — MONTELUKAST SODIUM 10 MG: 10 TABLET, COATED ORAL at 21:41

## 2020-12-12 RX ADMIN — REMDESIVIR 100 MG: 100 INJECTION, POWDER, LYOPHILIZED, FOR SOLUTION INTRAVENOUS at 05:10

## 2020-12-12 RX ADMIN — DEXAMETHASONE SODIUM PHOSPHATE 6 MG: 10 INJECTION, SOLUTION INTRAMUSCULAR; INTRAVENOUS at 10:06

## 2020-12-12 RX ADMIN — TRAMADOL HYDROCHLORIDE 50 MG: 50 TABLET ORAL at 09:09

## 2020-12-12 RX ADMIN — PRASUGREL 5 MG: 5 TABLET, FILM COATED ORAL at 08:23

## 2020-12-12 RX ADMIN — TAMSULOSIN HYDROCHLORIDE 0.4 MG: 0.4 CAPSULE ORAL at 08:23

## 2020-12-12 RX ADMIN — FAMOTIDINE 20 MG: 20 TABLET ORAL at 21:40

## 2020-12-12 RX ADMIN — ENOXAPARIN SODIUM 30 MG: 30 INJECTION, SOLUTION INTRAVENOUS; SUBCUTANEOUS at 08:30

## 2020-12-12 RX ADMIN — Medication 10 ML: at 08:30

## 2020-12-12 RX ADMIN — DONEPEZIL HYDROCHLORIDE 23 MG: 23 TABLET, FILM COATED ORAL at 21:41

## 2020-12-12 RX ADMIN — FINASTERIDE 5 MG: 5 TABLET, FILM COATED ORAL at 08:23

## 2020-12-12 RX ADMIN — LEVETIRACETAM 500 MG: 500 TABLET ORAL at 21:40

## 2020-12-12 RX ADMIN — FOLIC ACID 1 MG: 1 TABLET ORAL at 08:23

## 2020-12-12 RX ADMIN — ROSUVASTATIN CALCIUM 40 MG: 10 TABLET, FILM COATED ORAL at 08:23

## 2020-12-12 RX ADMIN — FAMOTIDINE 20 MG: 20 TABLET ORAL at 08:23

## 2020-12-12 ASSESSMENT — PAIN SCALES - GENERAL: PAINLEVEL_OUTOF10: 7

## 2020-12-12 NOTE — PROGRESS NOTES
Progress Note    Admit Date:  12/9/2020    Subjective:  Mr. Christianson Sportsman he is feeling well. No SOB. Denies pain or complaints. O2 requirements still high (4L)    Objective:   Patient Vitals for the past 4 hrs:   BP Temp Temp src Pulse Resp SpO2   12/12/20 0715 117/66 97.8 °F (36.6 °C) Axillary 60 16 98 %        Intake/Output Summary (Last 24 hours) at 12/12/2020 0900  Last data filed at 12/12/2020 0849  Gross per 24 hour   Intake 564 ml   Output --   Net 564 ml       Physical Exam:  Gen: Elderly  male, resting in bed  Eyes: No conjunctival injection. Resp: No accessory muscle use. No crackles. No wheezes. No rhonchi. On 3L. Good air movement throughout  CV: Regular rate. Regular rhythm. No murmur. No rub. No edema. GI: Soft, Non-tender. Non-distended. Normal bowel sounds. Skin: Warm and dry. No rash on exposed extremities. M/S: Muscle wasting to BLE  Neuro: Awake. Grossly nonfocal, moves extremities symmetrically, speech is muffled but coherent and appropriate.  Good strength in all extremities   Psych: Oriented x 1 - self only    Scheduled Meds:   dexamethasone  6 mg Intravenous Daily    sertraline  50 mg Oral Daily    donepezil  23 mg Oral Nightly    sodium chloride  20 mL Intravenous Once    sodium chloride flush  10 mL Intravenous 2 times per day    enoxaparin  30 mg Subcutaneous BID    finasteride  5 mg Oral Daily    folic acid  1 mg Oral Daily    levETIRAcetam  500 mg Oral BID    metoprolol succinate  12.5 mg Oral Daily    montelukast  10 mg Oral Nightly    prasugrel  5 mg Oral Daily    rosuvastatin  40 mg Oral Daily    tamsulosin  0.4 mg Oral Daily    remdesivir IVPB  100 mg Intravenous Q24H    famotidine  20 mg Oral BID       Continuous Infusions:      PRN Meds:  traMADol, sodium chloride flush, acetaminophen **OR** acetaminophen, polyethylene glycol, potassium chloride, magnesium sulfate, prochlorperazine, albuterol sulfate HFA **AND** ipratropium **AND** MDI Treatment      Data:  CBC:   Recent Labs     12/09/20 1910 12/11/20  0338   WBC 6.8 10.2   HGB 12.1* 10.9*   HCT 35.4* 31.9*   MCV 92.6 94.2    385     BMP:   Recent Labs     12/09/20 1910 12/11/20  0338   * 132*   K 3.2* 3.3*   CL 89* 95*   CO2 30 29   BUN 15 13   CREATININE 0.7* 0.6*     LIVER PROFILE:   Recent Labs     12/09/20 1910 12/11/20  0338   AST 33 27   ALT 24 21   BILITOT 0.3 0.3   ALKPHOS 147* 130*     CULTURES  SARS-CoV-2, NAAT DETECTEDPanic     Resp cx: pending     RADIOLOGY  CT Head WO Contrast   Final Result   No acute intracranial abnormality. XR CHEST PORTABLE   Final Result   Increased interstitial markings nonspecific finding could be due to chronic   interstitial thickening, mild edema versus developing atypical infection.              Assessment/Plan:  COVID PNA  Acute Hypoxic Respiratory Failure  - CXR showed increased interstitial markings  - EMS reported O2 at 80% on RA  - Admitted to PCU, tele, droplet + precautions  - supp O2, wean as tolerated  - on Solumedrol D#1-->start Decadron D#3, Remdesivir D#3, s/p CCP 12/10  - Pulm consulted  - currently on 3L NC-->4L     Hyponatremia  - Na 127 on arrival-->132  - serum   - IVF, monitor  - Monitor      Hypokalemia  - K 3.2 > 3.3 today  - replete  - trend     Hypomagnesemia  - 1.6  - replete and repeat Mg tomorrow    Type II DM  - well controlled  - Hold Metformin, no SSI needed at this time  - carb control diet    BPH  - cont Proscar and Flomax     GERD  - cont Pepcid     HTN  - BP intermittently low  - cont BB     HLD  - cont Crestor    CAD  - s/p stents  - seen in [place]  - no c/o pain, Trop neg  - on Effient, Toprol XL, Crestor     Hx of ICH  - in September of 2020 in [place]  - has OP f/u with neurology here in [place]  - CT head with no acute findings  - seizure precautions  - on Keppra      Dementia  - memory has been poor 2/2 ICH but has slowly been improving  - supportive measures  - cont Donepezil, Zoloft    Normocytic Anemia  - Hgb 12.1 -> 10.9  - no priors for comparison  - check iron studies, B12 and folate--> iron and TIBC low, iron saturations WNL, Ferritin high   - will monitor    DVT Prophylaxis: Lovenox  Diet: DIET GENERAL; Carb Control: 5 carb choices (75 gms)/meal  Code Status: DNR-CCA    Will need PT/OT ordered prior to d/c when appropriate    Josh Woodall PA-C 9:00 AM 12/12/2020

## 2020-12-12 NOTE — PROGRESS NOTES
Pulmonary Progress Note    CC: Hypoxemia, shortness of breath, Covid    Subjective: Unable to obtain because the patient is non-communicative; Intermittently awake and agitated and then sleepy    IV line: Peripheral      Intake/Output Summary (Last 24 hours) at 12/12/2020 1605  Last data filed at 12/12/2020 0849  Gross per 24 hour   Intake 342 ml   Output --   Net 342 ml       Exam:   BP (!) 99/59   Pulse 65   Temp 97.4 °F (36.3 °C) (Tympanic)   Resp 16   Ht 5' 10\" (1.778 m)   Wt 143 lb 1.6 oz (64.9 kg)   SpO2 98%   BMI 20.53 kg/m²  on 4 L  Gen: NAD, sleepy  Eyes: PERRL. No sclera icterus. No conjunctival injection. ENT: No discharge. Pharynx clear. Neck: Trachea midline. Normal thyroid. Resp: No accessory muscle use. No crackles. No wheezes. No rhonchi. No dullness on percussion. CV: Regular rate. Regular rhythm. No murmur or rub. No edema. GI: Non-tender. Non-distended. No masses. No organomegaly. Normal bowel sounds. No hernia. Skin: Warm and dry. No nodule on exposed extremities. No rash on exposed extremities. Lymph: No cervical LAD. No supraclavicular LAD. M/S: No cyanosis. No joint deformity. No clubbing. Neuro: Awakens to voice. Moves all extremities. CN grossly intact. Psych: Unable to obtain because the patient is non-communicative .      Scheduled Meds:   dexamethasone  6 mg Intravenous Daily    sertraline  50 mg Oral Daily    donepezil  23 mg Oral Nightly    sodium chloride  20 mL Intravenous Once    sodium chloride flush  10 mL Intravenous 2 times per day    enoxaparin  30 mg Subcutaneous BID    finasteride  5 mg Oral Daily    folic acid  1 mg Oral Daily    levETIRAcetam  500 mg Oral BID    metoprolol succinate  12.5 mg Oral Daily    montelukast  10 mg Oral Nightly    prasugrel  5 mg Oral Daily    rosuvastatin  40 mg Oral Daily    tamsulosin  0.4 mg Oral Daily    remdesivir IVPB  100 mg Intravenous Q24H    famotidine  20 mg Oral BID     Continuous Infusions:    PRN Meds:  traMADol, sodium chloride flush, acetaminophen **OR** acetaminophen, polyethylene glycol, potassium chloride, magnesium sulfate, prochlorperazine, albuterol sulfate HFA **AND** ipratropium **AND** MDI Treatment    Labs:  CBC:   Recent Labs     12/09/20 1910 12/11/20 0338 12/12/20  0939   WBC 6.8 10.2 9.2   HGB 12.1* 10.9* 11.8*   HCT 35.4* 31.9* 35.9*   MCV 92.6 94.2 96.5    385 364     BMP:   Recent Labs     12/09/20 1910 12/11/20  0338 12/12/20  0954   * 132* 129*   K 3.2* 3.3* 3.9   CL 89* 95* 97*   CO2 30 29 18*   BUN 15 13 11   CREATININE 0.7* 0.6* 0.6*     LIVER PROFILE:   Recent Labs     12/09/20 1910 12/11/20  0338   AST 33 27   ALT 24 21   BILITOT 0.3 0.3   ALKPHOS 147* 130*     PT/INR: No results for input(s): PROTIME, INR in the last 72 hours. APTT: No results for input(s): APTT in the last 72 hours. UA:  Recent Labs     12/10/20  0445   COLORU Yellow   PHUR 6.0   WBCUA 3-5   RBCUA 11-20*   BACTERIA Rare*   CLARITYU Clear   SPECGRAV 1.020   LEUKOCYTESUR Negative   UROBILINOGEN 0.2   BILIRUBINUR Negative   BLOODU SMALL*   GLUCOSEU Negative     No results for input(s): PHART, UFL5HVD, PO2ART in the last 72 hours. Cultures:   12/9/2020 SARS-CoV-2 positive    Films:  CXR 12/9/2020 interstitial opacities    Head CT 12/9/2020 no acute intracranial abnormality    ASSESSMENT:  · Acute hypoxemic respiratory failure, normally not on oxygen  · COVID-19 pneumonia  · Hyponatremia  · Dementia -was acute onset, precipitated by trauma. He is nonambulatory at baseline. He is not oriented at baseline.   · H/O seizure d/o     PLAN:  COVID-19 isolation, droplet plus  Supplemental oxygen to maintain SaO2 >92%; wean as tolerated    Remdesevir D#4/5, LFT monitoring for high risk medication  CCP 1 U on 12/10/20   Steroid D#4, now Decadron 6  Inhaled bronchodilators only as needed, MDI preferred  · Patient is on Effient  · DNR-CCA, spouse told nurse no heroic measures, no machines, DNI  · Wean oxygen off and then d/c planning after 5th dose Remdesivir from

## 2020-12-13 LAB
A/G RATIO: 1 (ref 1.1–2.2)
ALBUMIN SERPL-MCNC: 2.7 G/DL (ref 3.4–5)
ALP BLD-CCNC: 126 U/L (ref 40–129)
ALT SERPL-CCNC: <5 U/L (ref 10–40)
ANION GAP SERPL CALCULATED.3IONS-SCNC: 8 MMOL/L (ref 3–16)
AST SERPL-CCNC: <5 U/L (ref 15–37)
BILIRUB SERPL-MCNC: 0.3 MG/DL (ref 0–1)
BUN BLDV-MCNC: 12 MG/DL (ref 7–20)
CALCIUM SERPL-MCNC: 8.5 MG/DL (ref 8.3–10.6)
CHLORIDE BLD-SCNC: 98 MMOL/L (ref 99–110)
CO2: 26 MMOL/L (ref 21–32)
CREAT SERPL-MCNC: 0.6 MG/DL (ref 0.8–1.3)
GFR AFRICAN AMERICAN: >60
GFR NON-AFRICAN AMERICAN: >60
GLOBULIN: 2.8 G/DL
GLUCOSE BLD-MCNC: 74 MG/DL (ref 70–99)
POTASSIUM REFLEX MAGNESIUM: 4.1 MMOL/L (ref 3.5–5.1)
SODIUM BLD-SCNC: 132 MMOL/L (ref 136–145)
TOTAL PROTEIN: 5.5 G/DL (ref 6.4–8.2)

## 2020-12-13 PROCEDURE — 6370000000 HC RX 637 (ALT 250 FOR IP): Performed by: PHYSICIAN ASSISTANT

## 2020-12-13 PROCEDURE — 6360000002 HC RX W HCPCS: Performed by: INTERNAL MEDICINE

## 2020-12-13 PROCEDURE — 2060000000 HC ICU INTERMEDIATE R&B

## 2020-12-13 PROCEDURE — 6370000000 HC RX 637 (ALT 250 FOR IP): Performed by: INTERNAL MEDICINE

## 2020-12-13 PROCEDURE — 2500000003 HC RX 250 WO HCPCS: Performed by: INTERNAL MEDICINE

## 2020-12-13 PROCEDURE — 99233 SBSQ HOSP IP/OBS HIGH 50: CPT | Performed by: INTERNAL MEDICINE

## 2020-12-13 PROCEDURE — 80053 COMPREHEN METABOLIC PANEL: CPT

## 2020-12-13 PROCEDURE — 2580000003 HC RX 258: Performed by: INTERNAL MEDICINE

## 2020-12-13 PROCEDURE — 99232 SBSQ HOSP IP/OBS MODERATE 35: CPT | Performed by: PHYSICIAN ASSISTANT

## 2020-12-13 PROCEDURE — 36415 COLL VENOUS BLD VENIPUNCTURE: CPT

## 2020-12-13 RX ORDER — QUETIAPINE FUMARATE 25 MG/1
12.5 TABLET, FILM COATED ORAL 2 TIMES DAILY PRN
Status: DISCONTINUED | OUTPATIENT
Start: 2020-12-13 | End: 2020-12-14 | Stop reason: HOSPADM

## 2020-12-13 RX ADMIN — LEVETIRACETAM 500 MG: 500 TABLET ORAL at 08:49

## 2020-12-13 RX ADMIN — MONTELUKAST SODIUM 10 MG: 10 TABLET, COATED ORAL at 22:32

## 2020-12-13 RX ADMIN — FAMOTIDINE 20 MG: 20 TABLET ORAL at 22:32

## 2020-12-13 RX ADMIN — QUETIAPINE FUMARATE 12.5 MG: 25 TABLET ORAL at 22:31

## 2020-12-13 RX ADMIN — DONEPEZIL HYDROCHLORIDE 23 MG: 23 TABLET, FILM COATED ORAL at 22:32

## 2020-12-13 RX ADMIN — LEVETIRACETAM 500 MG: 500 TABLET ORAL at 22:32

## 2020-12-13 RX ADMIN — FOLIC ACID 1 MG: 1 TABLET ORAL at 08:49

## 2020-12-13 RX ADMIN — TAMSULOSIN HYDROCHLORIDE 0.4 MG: 0.4 CAPSULE ORAL at 08:49

## 2020-12-13 RX ADMIN — ENOXAPARIN SODIUM 30 MG: 30 INJECTION, SOLUTION INTRAVENOUS; SUBCUTANEOUS at 08:48

## 2020-12-13 RX ADMIN — Medication 10 ML: at 22:33

## 2020-12-13 RX ADMIN — FAMOTIDINE 20 MG: 20 TABLET ORAL at 08:49

## 2020-12-13 RX ADMIN — Medication 10 ML: at 08:49

## 2020-12-13 RX ADMIN — SERTRALINE HYDROCHLORIDE 50 MG: 50 TABLET ORAL at 08:49

## 2020-12-13 RX ADMIN — REMDESIVIR 100 MG: 100 INJECTION, POWDER, LYOPHILIZED, FOR SOLUTION INTRAVENOUS at 05:36

## 2020-12-13 RX ADMIN — DEXAMETHASONE SODIUM PHOSPHATE 6 MG: 10 INJECTION, SOLUTION INTRAMUSCULAR; INTRAVENOUS at 08:48

## 2020-12-13 RX ADMIN — PRASUGREL 5 MG: 5 TABLET, FILM COATED ORAL at 08:49

## 2020-12-13 RX ADMIN — METOPROLOL SUCCINATE 12.5 MG: 25 TABLET, FILM COATED, EXTENDED RELEASE ORAL at 08:49

## 2020-12-13 RX ADMIN — FINASTERIDE 5 MG: 5 TABLET, FILM COATED ORAL at 08:49

## 2020-12-13 RX ADMIN — ROSUVASTATIN CALCIUM 40 MG: 10 TABLET, FILM COATED ORAL at 08:49

## 2020-12-13 NOTE — PROGRESS NOTES
Physical/Occupational  Therapy    Referral received. Noted that patients discharge plan is to return to live with daughter (patient just relocated ~ 1 week ago from [facility] in [place] to daughters home). Spoke with RN who stated that MD plans to dc patient this date, and MD is loking to make sure equiptment needs are met. Conferred with LILY Valencia to determine  the goal of evaluation, as patient is currently bed-bound, non-communicative and would not be able to provide information. CM contacted patient daughter to determine what equiptment needs they might have. Per report of daughter, patient currently has hospital bed and borrowed wheelchair, although he is unable to transfer into wheelchair currently. Per CM daughter is planning to bring patient home and would like to arrange homecare. Per , PT/OT eval can be cancelled, as patietnts equiptment needs have been met. Discharge current PT/OT order as needs are met.     Karina Garza, PT #[id number]  Anupam Kinney OTR/L

## 2020-12-13 NOTE — PROGRESS NOTES
Pt awake Moans off and on and can occasionally call out for help. O2 sat 98 % in 2 liters O2 D/Juan Francisco 15 min aago. Pt's O2 sat remains at 93% on room air.

## 2020-12-13 NOTE — PROGRESS NOTES
Progress Note    Admit Date:  12/9/2020    Subjective:  Mr. Anusha Buckner is doing well today. On RA. Denies any complaints. Objective:   Patient Vitals for the past 4 hrs:   BP Temp Temp src Pulse Resp SpO2   12/13/20 0845 126/73 98 °F (36.7 °C) Oral 99 18 94 %        Intake/Output Summary (Last 24 hours) at 12/13/2020 1010  Last data filed at 12/13/2020 0539  Gross per 24 hour   Intake 980 ml   Output --   Net 980 ml       Physical Exam:  Gen: Elderly  male, resting in bed  Eyes: No conjunctival injection. Resp: No accessory muscle use. No crackles. No wheezes. No rhonchi. On RA. Good air movement throughout  CV: Regular rate. Regular rhythm. No murmur. No rub. No edema. GI: Soft, Non-tender. Non-distended. Normal bowel sounds. Skin: Warm and dry. No rash on exposed extremities. M/S: Muscle wasting to BLE  Neuro: Awake.  Grossly nonfocal, moves extremities symmetrically, speech is muffled and incoherent today  Psych: Oriented x 1 - self only    Scheduled Meds:   dexamethasone  6 mg Intravenous Daily    sertraline  50 mg Oral Daily    donepezil  23 mg Oral Nightly    sodium chloride  20 mL Intravenous Once    sodium chloride flush  10 mL Intravenous 2 times per day    enoxaparin  30 mg Subcutaneous BID    finasteride  5 mg Oral Daily    folic acid  1 mg Oral Daily    levETIRAcetam  500 mg Oral BID    metoprolol succinate  12.5 mg Oral Daily    montelukast  10 mg Oral Nightly    prasugrel  5 mg Oral Daily    rosuvastatin  40 mg Oral Daily    tamsulosin  0.4 mg Oral Daily    remdesivir IVPB  100 mg Intravenous Q24H    famotidine  20 mg Oral BID       Continuous Infusions:      PRN Meds:  traMADol, sodium chloride flush, acetaminophen **OR** acetaminophen, polyethylene glycol, potassium chloride, magnesium sulfate, prochlorperazine, albuterol sulfate HFA **AND** ipratropium **AND** MDI Treatment      Data:  CBC:   Recent Labs     12/11/20  0338 12/12/20  0939   WBC 10.2 9.2   HGB 10.9* 11.8*   HCT 31.9* 35.9*   MCV 94.2 96.5    364     BMP:   Recent Labs     12/12/20  0954 12/12/20  1559 12/13/20  0758   * 127* 132*   K 3.9 3.8 4.1   CL 97* 93* 98*   CO2 18* 27 26   BUN 11 12 12   CREATININE 0.6* 0.6* 0.6*     LIVER PROFILE:   Recent Labs     12/11/20  0338 12/12/20  1559 12/13/20  0758   AST 27 29  31 <5*   ALT 21 25  25 <5*   BILIDIR  --  <0.2  --    BILITOT 0.3 <0.2  0.3 0.3   ALKPHOS 130* 142*  145* 126     CULTURES  SARS-CoV-2, NAAT DETECTEDPanic     Resp cx: pending     RADIOLOGY  CT Head WO Contrast   Final Result   No acute intracranial abnormality. XR CHEST PORTABLE   Final Result   Increased interstitial markings nonspecific finding could be due to chronic   interstitial thickening, mild edema versus developing atypical infection.              Assessment/Plan:  COVID PNA  Acute Hypoxic Respiratory Failure  - CXR showed increased interstitial markings  - EMS reported O2 at 80% on RA  - Admitted to PCU, tele, droplet + precautions  - supp O2, wean as tolerated  - on Solumedrol D#1-->start Decadron D#3, Remdesivir D#4, s/p CCP 12/10  - Pulm consulted  - currently on 3L NC-->4L--> on RA     Hyponatremia  - Na 127 on arrival-->132  - serum   - IVF, monitor  - Monitor      Hypokalemia--resolved  - K 3.2 > 3.3 today  - replete  - trend     Hypomagnesemia--resolved  - 1.6  - replete     Type II DM  - well controlled  - Hold Metformin, no SSI needed at this time  - carb control diet    BPH  - cont Proscar and Flomax     GERD  - cont Pepcid     HTN  - BP intermittently low  - cont BB     HLD  - cont Crestor    CAD  - s/p stents  - seen in [place]  - no c/o pain, Trop neg  - on Effient, Toprol XL, Crestor     Hx of ICH  - in September of 2020 in [place]  - has OP f/u with neurology here in [place]  - CT head with no acute findings  - seizure precautions  - on Keppra      Dementia  - memory has been poor 2/2 ICH but has slowly been improving  - supportive measures  - cont Donepezil, Zoloft    Normocytic Anemia  - Hgb 12.1 -> 10.9--> 11.8  - no priors for comparison  - check iron studies, B12 and folate--> iron and TIBC low, iron saturations WNL, Ferritin high   - will monitor    DVT Prophylaxis: Lovenox  Diet: DIET GENERAL; Carb Control: 5 carb choices (75 gms)/meal  Code Status: DNR-CCA    Plan to d/c home tomorrow with Mony Dominguez to daughters home after 5th dose of remdesivir per pulm if stable on RA.      Lawernce Law PA-C 10:10 AM 12/13/2020

## 2020-12-13 NOTE — PROGRESS NOTES
AM assessment completed. Scheduled medications given per MAR. VSS on room air. A/O to self, confused at baseline. Denies any needs, call light in reach. Will monitor. Changed and repositioned.

## 2020-12-13 NOTE — PROGRESS NOTES
Pulmonary Progress Note    CC: Hypoxemia, shortness of breath, Covid    Subjective: Patient denies shortness of breath, says nothing is bothering him    IV line: Peripheral      Intake/Output Summary (Last 24 hours) at 12/13/2020 0751  Last data filed at 12/13/2020 0539  Gross per 24 hour   Intake 1100 ml   Output --   Net 1100 ml       Exam:   /69   Pulse 83   Temp 96.9 °F (36.1 °C) (Axillary)   Resp 15   Ht 5' 10\" (1.778 m)   Wt 143 lb 1.6 oz (64.9 kg)   SpO2 98%   BMI 20.53 kg/m²  on room air  Gen: NAD, sleepy  Eyes: PERRL. No sclera icterus. No conjunctival injection. ENT: No discharge. Pharynx clear. Neck: Trachea midline. Normal thyroid. Resp: No accessory muscle use. No crackles. No wheezes. No rhonchi. No dullness on percussion. CV: Regular rate. Regular rhythm. No murmur or rub. No edema. GI: Non-tender. Non-distended. No masses. No organomegaly. Normal bowel sounds. No hernia. Skin: Warm and dry. No nodule on exposed extremities. No rash on exposed extremities. Lymph: No cervical LAD. No supraclavicular LAD. M/S: No cyanosis. No joint deformity. No clubbing. Neuro: Awake and interactive, answers yes/no questions. Moves all extremities. CN grossly intact.   Psych: No agitation or depression    Scheduled Meds:   dexamethasone  6 mg Intravenous Daily    sertraline  50 mg Oral Daily    donepezil  23 mg Oral Nightly    sodium chloride  20 mL Intravenous Once    sodium chloride flush  10 mL Intravenous 2 times per day    enoxaparin  30 mg Subcutaneous BID    finasteride  5 mg Oral Daily    folic acid  1 mg Oral Daily    levETIRAcetam  500 mg Oral BID    metoprolol succinate  12.5 mg Oral Daily    montelukast  10 mg Oral Nightly    prasugrel  5 mg Oral Daily    rosuvastatin  40 mg Oral Daily    tamsulosin  0.4 mg Oral Daily    remdesivir IVPB  100 mg Intravenous Q24H    famotidine  20 mg Oral BID     Continuous Infusions:    PRN Meds:  traMADol, sodium chloride flush, measures, no machines, DNI  · Wean oxygen off and then d/c planning okay from my perspective after 5th dose Remdesivir

## 2020-12-13 NOTE — PROGRESS NOTES
Pt agitated. Pulling self out over the end of the bed. Pt incontinent of urien. Pt turned and cleaned. Repositioned for comfort. Appears to be sleeping. Maintaining O2 sat of 98 % on room air.

## 2020-12-13 NOTE — PROGRESS NOTES
AM assessment completed. Scheduled medications given per MAR. VSS on 2 lpm. A/O to self only. Denies any needs, call light in reach. Will monitor.

## 2020-12-14 ENCOUNTER — TELEPHONE (OUTPATIENT)
Dept: PRIMARY CARE CLINIC | Age: 75
End: 2020-12-14

## 2020-12-14 VITALS
HEART RATE: 57 BPM | HEIGHT: 70 IN | TEMPERATURE: 97.8 F | DIASTOLIC BLOOD PRESSURE: 73 MMHG | BODY MASS INDEX: 20.49 KG/M2 | WEIGHT: 143.1 LBS | SYSTOLIC BLOOD PRESSURE: 119 MMHG | RESPIRATION RATE: 18 BRPM | OXYGEN SATURATION: 100 %

## 2020-12-14 LAB
A/G RATIO: 1.1 (ref 1.1–2.2)
ALBUMIN SERPL-MCNC: 2.8 G/DL (ref 3.4–5)
ALP BLD-CCNC: 133 U/L (ref 40–129)
ALT SERPL-CCNC: 28 U/L (ref 10–40)
ANION GAP SERPL CALCULATED.3IONS-SCNC: 8 MMOL/L (ref 3–16)
AST SERPL-CCNC: 35 U/L (ref 15–37)
BILIRUB SERPL-MCNC: 0.3 MG/DL (ref 0–1)
BUN BLDV-MCNC: 10 MG/DL (ref 7–20)
CALCIUM SERPL-MCNC: 8.2 MG/DL (ref 8.3–10.6)
CHLORIDE BLD-SCNC: 98 MMOL/L (ref 99–110)
CO2: 26 MMOL/L (ref 21–32)
CREAT SERPL-MCNC: 0.6 MG/DL (ref 0.8–1.3)
GFR AFRICAN AMERICAN: >60
GFR NON-AFRICAN AMERICAN: >60
GLOBULIN: 2.6 G/DL
GLUCOSE BLD-MCNC: 118 MG/DL (ref 70–99)
GLUCOSE BLD-MCNC: 143 MG/DL (ref 70–99)
GLUCOSE BLD-MCNC: 73 MG/DL (ref 70–99)
GLUCOSE BLD-MCNC: 77 MG/DL (ref 70–99)
MAGNESIUM: 1.6 MG/DL (ref 1.8–2.4)
PERFORMED ON: ABNORMAL
PERFORMED ON: ABNORMAL
PERFORMED ON: NORMAL
POTASSIUM REFLEX MAGNESIUM: 3.3 MMOL/L (ref 3.5–5.1)
SODIUM BLD-SCNC: 132 MMOL/L (ref 136–145)
TOTAL PROTEIN: 5.4 G/DL (ref 6.4–8.2)

## 2020-12-14 PROCEDURE — 6360000002 HC RX W HCPCS: Performed by: INTERNAL MEDICINE

## 2020-12-14 PROCEDURE — 80053 COMPREHEN METABOLIC PANEL: CPT

## 2020-12-14 PROCEDURE — 36415 COLL VENOUS BLD VENIPUNCTURE: CPT

## 2020-12-14 PROCEDURE — 2580000003 HC RX 258: Performed by: INTERNAL MEDICINE

## 2020-12-14 PROCEDURE — 6370000000 HC RX 637 (ALT 250 FOR IP): Performed by: INTERNAL MEDICINE

## 2020-12-14 PROCEDURE — 6370000000 HC RX 637 (ALT 250 FOR IP): Performed by: PHYSICIAN ASSISTANT

## 2020-12-14 PROCEDURE — 99232 SBSQ HOSP IP/OBS MODERATE 35: CPT | Performed by: INTERNAL MEDICINE

## 2020-12-14 PROCEDURE — 99238 HOSP IP/OBS DSCHRG MGMT 30/<: CPT | Performed by: INTERNAL MEDICINE

## 2020-12-14 PROCEDURE — 2500000003 HC RX 250 WO HCPCS: Performed by: INTERNAL MEDICINE

## 2020-12-14 PROCEDURE — 83735 ASSAY OF MAGNESIUM: CPT

## 2020-12-14 RX ORDER — QUETIAPINE FUMARATE 25 MG/1
12.5 TABLET, FILM COATED ORAL 2 TIMES DAILY PRN
Qty: 30 TABLET | Refills: 0 | Status: SHIPPED | OUTPATIENT
Start: 2020-12-14

## 2020-12-14 RX ORDER — DEXAMETHASONE 6 MG/1
6 TABLET ORAL DAILY
Qty: 4 TABLET | Refills: 0 | Status: SHIPPED | OUTPATIENT
Start: 2020-12-14 | End: 2020-12-16 | Stop reason: ALTCHOICE

## 2020-12-14 RX ADMIN — TAMSULOSIN HYDROCHLORIDE 0.4 MG: 0.4 CAPSULE ORAL at 09:14

## 2020-12-14 RX ADMIN — ENOXAPARIN SODIUM 30 MG: 30 INJECTION, SOLUTION INTRAVENOUS; SUBCUTANEOUS at 09:14

## 2020-12-14 RX ADMIN — SERTRALINE HYDROCHLORIDE 50 MG: 50 TABLET ORAL at 09:14

## 2020-12-14 RX ADMIN — ROSUVASTATIN CALCIUM 40 MG: 10 TABLET, FILM COATED ORAL at 09:14

## 2020-12-14 RX ADMIN — POTASSIUM CHLORIDE 10 MEQ: 7.46 INJECTION, SOLUTION INTRAVENOUS at 17:26

## 2020-12-14 RX ADMIN — POTASSIUM CHLORIDE 10 MEQ: 7.46 INJECTION, SOLUTION INTRAVENOUS at 16:07

## 2020-12-14 RX ADMIN — FAMOTIDINE 20 MG: 20 TABLET ORAL at 09:16

## 2020-12-14 RX ADMIN — FINASTERIDE 5 MG: 5 TABLET, FILM COATED ORAL at 09:14

## 2020-12-14 RX ADMIN — FOLIC ACID 1 MG: 1 TABLET ORAL at 09:13

## 2020-12-14 RX ADMIN — DEXAMETHASONE SODIUM PHOSPHATE 6 MG: 10 INJECTION, SOLUTION INTRAMUSCULAR; INTRAVENOUS at 09:13

## 2020-12-14 RX ADMIN — METOPROLOL SUCCINATE 12.5 MG: 25 TABLET, FILM COATED, EXTENDED RELEASE ORAL at 09:14

## 2020-12-14 RX ADMIN — REMDESIVIR 100 MG: 100 INJECTION, POWDER, LYOPHILIZED, FOR SOLUTION INTRAVENOUS at 05:08

## 2020-12-14 RX ADMIN — ENOXAPARIN SODIUM 30 MG: 30 INJECTION, SOLUTION INTRAVENOUS; SUBCUTANEOUS at 02:10

## 2020-12-14 RX ADMIN — MAGNESIUM SULFATE HEPTAHYDRATE 2 G: 40 INJECTION, SOLUTION INTRAVENOUS at 13:25

## 2020-12-14 RX ADMIN — PRASUGREL 5 MG: 5 TABLET, FILM COATED ORAL at 09:14

## 2020-12-14 RX ADMIN — POTASSIUM CHLORIDE 10 MEQ: 7.46 INJECTION, SOLUTION INTRAVENOUS at 13:25

## 2020-12-14 RX ADMIN — LEVETIRACETAM 500 MG: 500 TABLET ORAL at 09:16

## 2020-12-14 NOTE — PLAN OF CARE
Problem: Gas Exchange - Impaired  Goal: Absence of hypoxia  Outcome: Ongoing  Goal: Promote optimal lung function  Outcome: Ongoing     Problem: Airway Clearance - Ineffective  Goal: Achieve or maintain patent airway  Outcome: Ongoing     Problem: Breathing Pattern - Ineffective  Goal: Ability to achieve and maintain a regular respiratory rate  Outcome: Ongoing     Problem:  Body Temperature -  Risk of, Imbalanced  Goal: Ability to maintain a body temperature within defined limits  Outcome: Ongoing  Goal: Will regain or maintain usual level of consciousness  Outcome: Ongoing  Goal: Complications related to the disease process, condition or treatment will be avoided or minimized  Outcome: Ongoing

## 2020-12-14 NOTE — FLOWSHEET NOTE
12/14/20 0829   Vital Signs   Temp 97.6 °F (36.4 °C)   Temp Source Temporal   Pulse 93   Heart Rate Source Monitor   Resp 16   BP (!) 178/64   Level of Consciousness Alert (0)   MEWS Score 1   Patient Currently in Pain Denies   Oxygen Therapy   SpO2 97 %   O2 Device None (Room air)       Shift assessment complete. See flow sheet. Scheduled meds given. See MAR. Pt able to state his name/birthday/and where he is at. Call light and bedside table within reach. No further needs noted at this time. Will continue to monitor.

## 2020-12-14 NOTE — CARE COORDINATION
INTERDISCIPLINARY PLAN OF CARE CONFERENCE    Date/Time: 12/14/2020 10:18 AM  Completed by: Pat Dior MSW, LSW. Case Management      Patient Name:  Tammi Blevins  YOB: 1945  Admitting Diagnosis: Acute respiratory failure with hypoxia ([address] [J96.01]  Acute respiratory failure with hypoxia ([address] [J96.01]     Admit Date/Time:  12/9/2020  6:40 PM    Chart reviewed. Interdisciplinary team contacted or reviewed plan related to patient progress and discharge plans. Disciplines included Case Management, Nursing, and Dietitian. Current Status: ongoing   PT/OT recommendation for discharge plan of care: Per note from Pam Jin, [facility] on 12/13/2020, No PT/OTrecomendations as needs are met. Expected D/C Disposition:  Home  Confirmed plan with patient and/or family Yes confirmed with: pt's daughter Kenzie Hdez via phone call at [phone]    Discharge Plan Comments: Chart review completed. Called and spoke with pt's daughter Miguel Lange. She confirms the plan is for him to return home with her as she provides 24/7 assist and wants [facility]) for [address]. She request transportation home and reports no preference on the company. She is aware that insurance may not cover it if not medically necessary and the transport company would be the one to determine that as they likely will send it to insurance. She stated understanding. She inquired about an order for a hospital bed as [employer] told her this is needed for medicare to pay. Explained therapy recommended no additional DME as he has a hospital bed at home already (she confirmed). Encouraged her to speak to her PCP to see about an order since an order could not be obtained here since no recommendation. She stated understanding and denied questions or concerns to CM. Spoke with [facility] liaison to notify her. She stated she would follow for [address].      Home O2 in place on admit: No

## 2020-12-14 NOTE — CARE COORDINATION
[facility]    Referral received from CM to follow for home care services. I will follow for needs, and speak with patient to verify demos.       [facility] approved        Martin Davies  Work mobile: [phone]  [facility] office: [phone]

## 2020-12-14 NOTE — DISCHARGE SUMMARY
Name:  Herman Lomeli  Room:  EN11/EN-11  MRN:    [id number]    Discharge Summary      This discharge summary is in conjunction with a complete physical exam done on the day of discharge. Discharging Physician: Dr. Rose Grant: 12/9/2020  Discharge: 12/14/2020     HPI taken from admission H&P:    The patient is a 76 y.o. male with PMH below, presents with SOB/STEIN. Patient is unable to provide history secondary to dementia. Reportedly the patient's daughter reported to ER staff that he had been increasingly short of breath since the a.m. He was found to be Covid positive in the ER. He has been requiring 2 to 3 L of oxygen since arrival.  He is not normally on O2. No additional history available at this time.     Diagnoses this Admission and Hospital Course     COVID PNA  Acute Hypoxic Respiratory Failure  - CXR showed increased interstitial markings  - EMS reported O2 at 80% on RA  - Admitted to PCU, tele, droplet + precautions  - supp O2, wean as tolerated  - on Solumedrol D#1-->start Decadron D#4, Remdesivir D#5, s/p CCP 12/10  - Pulm consulted  - currently on 3L NC-->4L--> on RA     Hyponatremia  - Na 127 on arrival-->132 after IVF  - serum      Hypokalemia--resolved  - K 3.2 > 3.3 today  - repleted     Hypomagnesemia--resolved  - 1.6  - repleted     Type II DM  - well controlled  - resume metformin, no SSI needed at this time  - carb control diet     BPH  - cont Proscar and Flomax     GERD  - cont Pepcid     HTN  - BP intermittently low  - cont BB     HLD  - cont Crestor     CAD  - s/p stents  - seen in [place]  - no c/o pain, Trop neg  - on Effient, Toprol XL, Crestor     Hx of ICH  - in September of 2020 in [place]  - [place] f/[place]  - CT head with no acute findings  - seizure precautions  - on Keppra      Dementia  - memory has been poor 2/2 ICH but has slowly been improving  - supportive measures  - cont Donepezil, Zoloft     Normocytic Anemia  - Hgb 12.1 -> 10.9--> 11.8  - no priors for comparison  - check iron studies, B12 and folate--> iron and TIBC low, iron saturations WNL, Ferritin high       Procedures (Please Review Full Report for Details)  None     Consults    Pulmonology    Physical Exam at Discharge:    BP (!) 123/50   Pulse 87   Temp 98 °F (36.7 °C) (Temporal)   Resp 17   Ht 5' 10\" (1.778 m)   Wt 143 lb 1.6 oz (64.9 kg)   SpO2 98%   BMI 20.53 kg/m²     Gen: Elderly  male, resting in bed  Eyes: No conjunctival injection. Resp: No accessory muscle use. No crackles. No wheezes. No rhonchi. On RA. Good air movement throughout  CV: Regular rate. Regular rhythm. No murmur. No rub. No edema. GI: Soft, Non-tender. Non-distended. Normal bowel sounds. Skin: Warm and dry. No rash on exposed extremities. M/S: Muscle wasting to BLE  Neuro: Awake. Grossly nonfocal, moves extremities symmetrically, speech is muffled and incoherent today  Psych: Oriented x 1 - self only    CBC:   Recent Labs     12/12/20  0939   WBC 9.2   HGB 11.8*   HCT 35.9*   MCV 96.5        BMP:   Recent Labs     12/12/20  1559 12/13/20  0758 12/14/20  0730   * 132* 132*   K 3.8 4.1 3.3*   CL 93* 98* 98*   CO2 27 26 26   BUN 12 12 10   CREATININE 0.6* 0.6* 0.6*     LIVER PROFILE:   Recent Labs     12/12/20  1559 12/13/20  0758 12/14/20  0730   AST 29  31 <5* 35   ALT 25  25 <5* 28   BILIDIR <0.2  --   --    BILITOT <0.2  0.3 0.3 0.3   ALKPHOS 142*  145* 126 133*       CULTURES  SARS-CoV-2, NAAT DETECTEDPanic     Resp cx: pending    RADIOLOGY  CT Head WO Contrast   Final Result   No acute intracranial abnormality. XR CHEST PORTABLE   Final Result   Increased interstitial markings nonspecific finding could be due to chronic   interstitial thickening, mild edema versus developing atypical infection.              Discharge Medications     Medication List      START taking these medications    dexamethasone 6 MG tablet  Commonly known as: DECADRON  Take 1 tablet by mouth daily for 4 days     QUEtiapine 25 MG tablet  Commonly known as: SEROQUEL  Take 0.5 tablets by mouth 2 times daily as needed for Agitation        CONTINUE taking these medications    Blood Pressure Kit  1 kit by Does not apply route daily     Donepezil HCl 23 MG Tabs tablet  Commonly known as: ARICEPT     dutasteride 0.5 MG capsule  Commonly known as: Avodart  Take 1 capsule by mouth daily     famotidine 20 MG tablet  Commonly known as: PEPCID  Take 1 tablet by mouth 2 times daily     folic acid 1 MG tablet  Commonly known as: FOLVITE  Take 1 tablet by mouth daily     INSULIN SYRINGE .3CC/31GX5/16\" 31G X 5/16\" 0.3 ML Misc  1 each by Does not apply route daily     levETIRAcetam 500 MG tablet  Commonly known as: KEPPRA  Take 1 tablet by mouth 2 times daily     metFORMIN 500 MG tablet  Commonly known as: GLUCOPHAGE  Take 1 tablet by mouth 2 times daily (with meals)     metoprolol succinate 25 MG extended release tablet  Commonly known as:  Toprol XL  Take 0.5 tablets by mouth daily     montelukast 10 MG tablet  Commonly known as: SINGULAIR  Take 1 tablet by mouth nightly     prasugrel 5 MG Tabs  Commonly known as: EFFIENT  Take 1 tablet by mouth daily     rosuvastatin 40 MG tablet  Commonly known as: CRESTOR  Take 1 tablet by mouth daily     sertraline 25 MG tablet  Commonly known as: Zoloft  Take 1 tablet by mouth daily     tamsulosin 0.4 MG capsule  Commonly known as: FLOMAX  Take 1 capsule by mouth daily        STOP taking these medications    nitrofurantoin (macrocrystal-monohydrate) 100 MG capsule  Commonly known as: MACROBID           Where to Get Your Medications      These medications were sent to [address], [place] - [address] - F [phone]  [address] [place] [address]    Phone: [phone]   · dexamethasone 6 MG tablet  · QUEtiapine 25 MG tablet       Discharged in stable condition to home with his daughter     Follow Up: Follow up with PCP    Miesha Talbot MD 12/14/2020 2:41 PM

## 2020-12-14 NOTE — PROGRESS NOTES
MCV 96.5        BMP:   Recent Labs     12/12/20  1559 12/13/20  0758 12/14/20  0730   * 132* 132*   K 3.8 4.1 3.3*   CL 93* 98* 98*   CO2 27 26 26   BUN 12 12 10   CREATININE 0.6* 0.6* 0.6*     LIVER PROFILE:   Recent Labs     12/12/20  1559 12/13/20  0758 12/14/20  0730   AST 29  31 <5* 35   ALT 25  25 <5* 28   BILIDIR <0.2  --   --    BILITOT <0.2  0.3 0.3 0.3   ALKPHOS 142*  145* 126 133*     PT/INR: No results for input(s): PROTIME, INR in the last 72 hours. APTT: No results for input(s): APTT in the last 72 hours. UA:  No results for input(s): NITRITE, COLORU, PHUR, LABCAST, WBCUA, RBCUA, MUCUS, TRICHOMONAS, YEAST, BACTERIA, CLARITYU, SPECGRAV, LEUKOCYTESUR, UROBILINOGEN, BILIRUBINUR, BLOODU, GLUCOSEU, AMORPHOUS in the last 72 hours. Invalid input(s): KETONESU  No results for input(s): PHART, QDI6JFD, PO2ART in the last 72 hours. Cultures:   12/9/2020 SARS-CoV-2 positive    Films:  CXR 12/9/2020 interstitial opacities    Head CT 12/9/2020 no acute intracranial abnormality    ASSESSMENT:  · Acute hypoxemic respiratory failure, normally not on oxygen  · COVID-19 pneumonia  · Hyponatremia  · Dementia -was acute onset, precipitated by trauma. He is nonambulatory at baseline. He is not oriented at baseline.   · H/O seizure d/o   · Hyponatremia    PLAN:  COVID-19 isolation, droplet plus  Supplemental oxygen to maintain SaO2 >92%; wean as tolerated    S/P 5 days Remdesevir    CCP 1 U on 12/10/20   Steroid D#6/10, now Decadron 6  Inhaled bronchodilators only as needed, MDI preferred  · Patient is on Effient  · Management of hyponatremia per internal medicine  · DNR-CCA, spouse told nurse no heroic measures, no machines, DNI  · Okay with me for home

## 2020-12-14 NOTE — CARE COORDINATION
DISCHARGE ORDER  Date/Time 2020 2:30 PM  Completed by: Guillermo Keys MSWCHASW. Case Management    Patient Name: Funmilayo Awad      : 1945  Admitting Diagnosis: Acute respiratory failure with hypoxia ([address] [J96.01]  Acute respiratory failure with hypoxia ([address] [J96.01]      Admit order Date and Status 12/10/2020  (verify MD's last order for status of admission)      Noted discharge order. If applicable PT/OT recommendation at Discharge: no recommendations per note from Ever Blevins, [facility] on 2020  DME recommendation by PT/OT: n/a  Confirmed discharge plan: Yes  with whom Daughter Corinna Knutson via phone call    Discharge Plan: Reviewed chart. Role of discharge planner explained and patient verbalized understanding. Discharge order is noted. Called and spoke with pt's daughter Corinna Knutson who was in agreement with pt being discharged home today with a 7pm pickup time and [facility] for [facility]. Verified address on facesheet. No questions or concerns from daughter expressed. [address] with [facility] aware of discharge order and stated she would arrange for [facility]. Regarding transportation home: Spoke with [address] ambulance who stated earliest pickup time is 10pm. Spoke with [facility] who stated earliest pickup time is 8pm. Spoke with Tamar at [facility] who scheduled  at 7:00pm.    Transportation:               Medical Transport explained with choice list offered to pt/family. Choice:Yes(no preference)  Agency used: [facility]. Scheduled with Tamar at [phone]   time:  7:00pm    Pt/family/Nursing/Facility aware of  time: Yes Names: Pt's daughter Corinna Knutson  Ambulance form completed:  yes       Has Home O2 in place on admit:  No    Pt is being d/c'd to Home today. Pt's O2 sats are 98% on RA. Discharge timeout done with [facility]. All discharge needs and concerns addressed.

## 2020-12-14 NOTE — DISCHARGE INSTR - COC
Continuity of Care Form    Patient Name: Kathryn Tran   :  1945  MRN:  [id number]    [facility] date:  2020  Discharge date:  20    Code Status Order: DNR-CCA   Advance Directives:   [facility] Documentation     Date/Time Healthcare Directive Type of Healthcare Directive Copy in [address] Agent's Name Healthcare Agent's Phone Number    12/10/20 5836  No, patient does not have an advance directive for healthcare treatment -- -- -- -- --          Admitting Physician:  Anibal Clarke MD  PCP: Guadalupe Jiang MD    Discharging Nurse: [address] Unit/Room#: EN11/EN-11  Discharging Unit Phone Number: [phone]    Emergency Contact:   Extended Emergency Contact Information  Primary Emergency Contact: Emma Campbell  Address: [address] Phone: [phone]  Mobile Phone: [phone]  Relation: Child   needed? No  Secondary Emergency Contact: Denisa Burns  Relation: Spouse   needed? No    Past Surgical History:  Past Surgical History:   Procedure Laterality Date    CARDIAC SURGERY      5 stints       Immunization History: There is no immunization history on file for this patient.     Active Problems:  Patient Active Problem List   Diagnosis Code    Acute respiratory failure with hypoxia (HCC) J96.01    Pneumonia due to severe acute respiratory syndrome coronavirus 2 (SARS-CoV-2) U07.1, J12.89    Hyponatremia E87.1    Hypokalemia E87.6    Dehydration E86.0    Normocytic anemia D64.9    Dementia without behavioral disturbance (HCC) F03.90       Isolation/Infection:   Isolation          Droplet Plus        Patient Infection Status     Infection Onset Added Last Indicated Last Indicated By Review Planned Expiration Resolved Resolved By    COVID-19 20 COVID-19 20      Resolved    COVID-19 Rule Out 20 12/09/20 12/09/20 COVID-19 (Ordered)   12/09/20 Rule-Out Test Resulted          Nurse Assessment:  Last Vital Signs: BP (!) 123/50   Pulse 87   Temp 98 °F (36.7 °C) (Temporal)   Resp 17   Ht 5' 10\" (1.778 m)   Wt 143 lb 1.6 oz (64.9 kg)   SpO2 98%   BMI 20.53 kg/m²     Last documented pain score (0-10 scale): Pain Level: 7  Last Weight:   Wt Readings from Last 1 Encounters:   12/14/20 143 lb 1.6 oz (64.9 kg)     Mental Status:  disoriented and alert    IV Access:  - None    Nursing Mobility/ADLs:  Walking   Dependent  Transfer  Dependent  Bathing  Dependent  Dressing  Dependent  Toileting  Dependent  Feeding  Dependent  Med Admin  Dependent  Med Delivery   whole and small sips of water    Wound Care Documentation and Therapy:        Elimination:  Continence:   · Bowel: No  · Bladder: No  Urinary Catheter: None   Colostomy/Ileostomy/Ileal Conduit: No       Date of Last BM: 12/14/20    Intake/Output Summary (Last 24 hours) at 12/14/2020 1353  Last data filed at 12/14/2020 0540  Gross per 24 hour   Intake 610 ml   Output --   Net 610 ml     I/O last 3 completed shifts: In: 56 [P.O.:360; IV Piggyback:250]  Out: -     Safety Concerns:     Fall    Impairments/Disabilities:      None    Nutrition Therapy:  Current Nutrition Therapy:   - Oral Diet:  General    Routes of Feeding: Oral  Liquids: No Restrictions  Daily Fluid Restriction: no  Last Modified Barium Swallow with Video (Video Swallowing Test): not done    Treatments at the Time of Hospital Discharge:   Respiratory Treatments: NA  Oxygen Therapy:  is not on home oxygen therapy.   Ventilator:    - No ventilator support    Rehab Therapies: Physical Therapy and Occupational Therapy  Weight Bearing Status/Restrictions: No weight bearing restirctions  Other Medical Equipment (for information only, NOT a DME order):  wheelchair, bath bench, bedside commode and hospital bed  Other Treatments: NA    Patient's personal belongings (please select all that are sent with patient):  None    RN SIGNATURE:  Electronically signed by Shelli Hansen RN on 41/71/[birth year] at 4:50 PM EST    CASE MANAGEMENT/SOCIAL WORK SECTION    Inpatient Status Date: ***    Readmission Risk Assessment Score:  Readmission Risk              Risk of Unplanned Readmission:        25           Discharging to Facility/ Agency   · Name: [facility] for SN,PT/OT,HHA  · Address:  · Phone:  · Fax:    Dialysis Facility (if applicable)   · Name:  · Address:  · Dialysis Schedule:  · Phone:  · Fax:    / signature: Electronically signed by Som Orlando RN on 12/14/20 at 1:55 PM EST    PHYSICIAN SECTION    Prognosis: {Prognosis:3186567896}    Condition at Discharge: [facility] Patient Condition:541180274}    Rehab Potential (if transferring to Rehab): {Prognosis:8134306159}    Recommended Labs or Other Treatments After Discharge: ***    Physician Certification: I certify the above information and transfer of Francisco Dobson  is necessary for the continuing treatment of the diagnosis listed and that he requires {Admit to Appropriate Level of Care:59692} for {GREATER/LESS:122731802} 30 days.      Update Admission H&P: {CHP DME Changes in OVQBK:161422472}    PHYSICIAN SIGNATURE:  Electronically signed by Roselia Avian RN on 12/14/20 at 1:54 PM EST

## 2020-12-14 NOTE — CARE COORDINATION
[facility]    DC order noted, all docs needed have been faxed to [facility] for home care services.         Martin Davies  Work mobile: [phone]  [facility] office: [phone]

## 2020-12-15 ENCOUNTER — TELEPHONE (OUTPATIENT)
Dept: PRIMARY CARE CLINIC | Age: 75
End: 2020-12-15

## 2020-12-15 ENCOUNTER — CARE COORDINATION (OUTPATIENT)
Dept: CASE MANAGEMENT | Age: 75
End: 2020-12-15

## 2020-12-15 PROCEDURE — 1111F DSCHRG MED/CURRENT MED MERGE: CPT | Performed by: STUDENT IN AN ORGANIZED HEALTH CARE EDUCATION/TRAINING PROGRAM

## 2020-12-15 NOTE — TELEPHONE ENCOUNTER
SW Contact PC from Goals of Care representative Alina from [facility] with update that she has connected with caregiver/daughter who states patient is now home form hospital stay. States pt is with moaning and did not sleep at hs. Information packet from hospice to be dropped off to caregiver/daughter to review with wife (in [place]) and to request decision making [place] to sign consents.

## 2020-12-15 NOTE — PROGRESS NOTES
Pt picked up by LS. Report given. Daughter updated on pt being picked up. Pt turned and cleaned prior to .

## 2020-12-15 NOTE — CARE COORDINATION
Judy 45 Transitions Initial Follow Up Call    Call within 2 business days of discharge: Yes    Patient: Martin Rogers Patient : 1945   MRN: <[id number]>  Reason for Admission: covid  Discharge Date: 20 RARS: Readmission Risk Score: 26      Last Discharge [facility]       Complaint Diagnosis Description Type Department Provider    20 Shortness of Breath Acute respiratory failure with hypoxia ([address] . .. ED to Hosp-Admission (Discharged) ([place]) Jon Galvin MD; Mo. .. Spoke with: Stefany Riggins, daughter HIPAA form verified in system    Facility: [address] [place] [address] to be reviewed by the provider   Additional needs identified to be addressed with provider Yes  DME-hospital bed    Discussed COVID-19 related testing which was available at this time. Test results were positive. Patient informed of results, if available? Yes         Method of communication with provider : phone    Advance Care Planning:   Does patient have an Advance Directive:  not on file. Was this a readmission? No  Patients top risk factors for readmission: medical condition    Care Transition Nurse (CTN) contacted the family by telephone to perform post hospital discharge assessment. Verified name and  with family as identifiers. Provided introduction to self, and explanation of the CTN role. CTN reviewed discharge instructions, medical action plan and red flags with family who verbalized understanding. Family given an opportunity to ask questions and does not have any further questions or concerns at this time. Were discharge instructions available to patient? Yes. Reviewed appropriate site of care based on symptoms and resources available to patient including: PCP. The family agrees to contact the PCP office for questions related to their healthcare.      Medication reconciliation was performed with family, who verbalizes understanding of administration of home medications. Advised obtaining a 90-day supply of all daily and as-needed medications. Covid Risk Education    Patient has following risk factors of: pneumonia and acute respiratory failure. Education provided regarding infection prevention, and signs and symptoms of COVID-19 and when to seek medical attention with family who verbalized understanding. Discussed exposure protocols and quarantine From CDC: Are you at higher risk for severe illness?   and given an opportunity for questions and concerns. The family agrees to contact the COVID-19 hotline [phone] or PCP office for questions related to COVID-19. For more information on steps you can take to protect yourself, see CDC's How to Protect Yourself     Patient/family/caregiver given information for [employer] [facility] and agrees to enroll no  Patient's preferred e-mail: declines  Patient's preferred phone number: declines    Discussed follow-up appointments. If no appointment was previously scheduled, appointment scheduling offered: Yes. Is follow up appointment scheduled within 7 days of discharge? No      Plan for follow-up call in 5-7 days based on severity of symptoms and risk factors. Number listed is for patient's daughter, Blossom Evans and HIPAA form verified in system. Dominick Goins reviewed recent hospitalization and patient's recent move to [place] from [place]. Patient was in a long term facility until patient was diagnosed with Covid and treated at [facility]. Patient returned to daughter's home upon discharge. Dominick Goins verified all medications and patient taking as directed. Dominick Goins has taken vital signs this morning and results T 97.1, HR 67, /71, O2 sat 99% RA, glucose 78. Dominick Goins is going to schedule follow up visit with Dr Javi Pond, but patient is not established with office yet, so CTN offered care clinic information. Dominick Goins will call care clinic if needed. Dominick Goins had not heard from [facility] yet this morning.  CTN placed call to

## 2020-12-16 ENCOUNTER — APPOINTMENT (OUTPATIENT)
Dept: GENERAL RADIOLOGY | Age: 75
End: 2020-12-16
Payer: MEDICARE

## 2020-12-16 ENCOUNTER — HOSPITAL ENCOUNTER (EMERGENCY)
Age: 75
Discharge: HOME | End: 2020-12-17
Attending: EMERGENCY MEDICINE
Payer: MEDICARE

## 2020-12-16 ENCOUNTER — TELEPHONE (OUTPATIENT)
Dept: PRIMARY CARE CLINIC | Age: 75
End: 2020-12-16

## 2020-12-16 LAB
A/G RATIO: 1.2 (ref 1.1–2.2)
ALBUMIN SERPL-MCNC: 3.1 G/DL (ref 3.4–5)
ALP BLD-CCNC: 139 U/L (ref 40–129)
ALT SERPL-CCNC: 32 U/L (ref 10–40)
ANION GAP SERPL CALCULATED.3IONS-SCNC: 10 MMOL/L (ref 3–16)
AST SERPL-CCNC: 39 U/L (ref 15–37)
BASOPHILS ABSOLUTE: 0 K/UL (ref 0–0.2)
BASOPHILS RELATIVE PERCENT: 0.1 %
BILIRUB SERPL-MCNC: 0.3 MG/DL (ref 0–1)
BUN BLDV-MCNC: 21 MG/DL (ref 7–20)
CALCIUM SERPL-MCNC: 8.3 MG/DL (ref 8.3–10.6)
CHLORIDE BLD-SCNC: 95 MMOL/L (ref 99–110)
CO2: 25 MMOL/L (ref 21–32)
CREAT SERPL-MCNC: 0.8 MG/DL (ref 0.8–1.3)
EKG ATRIAL RATE: 64 BPM
EKG DIAGNOSIS: NORMAL
EKG P AXIS: 67 DEGREES
EKG P-R INTERVAL: 142 MS
EKG Q-T INTERVAL: 426 MS
EKG QRS DURATION: 92 MS
EKG QTC CALCULATION (BAZETT): 439 MS
EKG R AXIS: 33 DEGREES
EKG T AXIS: 59 DEGREES
EKG VENTRICULAR RATE: 64 BPM
EOSINOPHILS ABSOLUTE: 0 K/UL (ref 0–0.6)
EOSINOPHILS RELATIVE PERCENT: 0 %
GFR AFRICAN AMERICAN: >60
GFR NON-AFRICAN AMERICAN: >60
GLOBULIN: 2.5 G/DL
GLUCOSE BLD-MCNC: 97 MG/DL (ref 70–99)
HCT VFR BLD CALC: 30.8 % (ref 40.5–52.5)
HEMOGLOBIN: 10.2 G/DL (ref 13.5–17.5)
LACTIC ACID: 1.7 MMOL/L (ref 0.4–2)
LYMPHOCYTES ABSOLUTE: 0.9 K/UL (ref 1–5.1)
LYMPHOCYTES RELATIVE PERCENT: 4.6 %
MCH RBC QN AUTO: 31.4 PG (ref 26–34)
MCHC RBC AUTO-ENTMCNC: 33.3 G/DL (ref 31–36)
MCV RBC AUTO: 94.4 FL (ref 80–100)
MONOCYTES ABSOLUTE: 1 K/UL (ref 0–1.3)
MONOCYTES RELATIVE PERCENT: 5.1 %
NEUTROPHILS ABSOLUTE: 17.9 K/UL (ref 1.7–7.7)
NEUTROPHILS RELATIVE PERCENT: 90.2 %
PDW BLD-RTO: 14.1 % (ref 12.4–15.4)
PLATELET # BLD: 383 K/UL (ref 135–450)
PMV BLD AUTO: 7.3 FL (ref 5–10.5)
POTASSIUM REFLEX MAGNESIUM: 3.7 MMOL/L (ref 3.5–5.1)
PROCALCITONIN: 0.22 NG/ML (ref 0–0.15)
RBC # BLD: 3.26 M/UL (ref 4.2–5.9)
SODIUM BLD-SCNC: 130 MMOL/L (ref 136–145)
TOTAL PROTEIN: 5.6 G/DL (ref 6.4–8.2)
TROPONIN: <0.01 NG/ML
WBC # BLD: 19.8 K/UL (ref 4–11)

## 2020-12-16 PROCEDURE — 36415 COLL VENOUS BLD VENIPUNCTURE: CPT

## 2020-12-16 PROCEDURE — 93005 ELECTROCARDIOGRAM TRACING: CPT | Performed by: EMERGENCY MEDICINE

## 2020-12-16 PROCEDURE — 2580000003 HC RX 258: Performed by: EMERGENCY MEDICINE

## 2020-12-16 PROCEDURE — 71045 X-RAY EXAM CHEST 1 VIEW: CPT

## 2020-12-16 PROCEDURE — 84145 PROCALCITONIN (PCT): CPT

## 2020-12-16 PROCEDURE — 96374 THER/PROPH/DIAG INJ IV PUSH: CPT

## 2020-12-16 PROCEDURE — 85025 COMPLETE CBC W/AUTO DIFF WBC: CPT

## 2020-12-16 PROCEDURE — 99285 EMERGENCY DEPT VISIT HI MDM: CPT

## 2020-12-16 PROCEDURE — 83605 ASSAY OF LACTIC ACID: CPT

## 2020-12-16 PROCEDURE — 84484 ASSAY OF TROPONIN QUANT: CPT

## 2020-12-16 PROCEDURE — 93010 ELECTROCARDIOGRAM REPORT: CPT | Performed by: INTERNAL MEDICINE

## 2020-12-16 PROCEDURE — 6360000002 HC RX W HCPCS: Performed by: EMERGENCY MEDICINE

## 2020-12-16 PROCEDURE — 80053 COMPREHEN METABOLIC PANEL: CPT

## 2020-12-16 RX ORDER — DEXAMETHASONE SODIUM PHOSPHATE 10 MG/ML
6 INJECTION, SOLUTION INTRAMUSCULAR; INTRAVENOUS ONCE
Status: COMPLETED | OUTPATIENT
Start: 2020-12-16 | End: 2020-12-16

## 2020-12-16 RX ORDER — ACETAMINOPHEN 325 MG/1
650 TABLET ORAL EVERY 6 HOURS PRN
Status: DISCONTINUED | OUTPATIENT
Start: 2020-12-16 | End: 2020-12-17 | Stop reason: HOSPADM

## 2020-12-16 RX ORDER — ACETAMINOPHEN 650 MG/1
650 SUPPOSITORY RECTAL EVERY 6 HOURS PRN
Status: DISCONTINUED | OUTPATIENT
Start: 2020-12-16 | End: 2020-12-17 | Stop reason: HOSPADM

## 2020-12-16 RX ORDER — 0.9 % SODIUM CHLORIDE 0.9 %
30 INTRAVENOUS SOLUTION INTRAVENOUS ONCE
Status: COMPLETED | OUTPATIENT
Start: 2020-12-16 | End: 2020-12-16

## 2020-12-16 RX ORDER — DEXAMETHASONE 0.5 MG/1
0.5 TABLET ORAL
Qty: 9 TABLET | Refills: 0 | Status: SHIPPED | OUTPATIENT
Start: 2020-12-17 | End: 2020-12-26

## 2020-12-16 RX ADMIN — DEXAMETHASONE SODIUM PHOSPHATE 6 MG: 10 INJECTION, SOLUTION INTRAMUSCULAR; INTRAVENOUS at 15:11

## 2020-12-16 RX ADMIN — SODIUM CHLORIDE 1947 ML: 9 INJECTION, SOLUTION INTRAVENOUS at 10:21

## 2020-12-16 ASSESSMENT — ENCOUNTER SYMPTOMS
VOMITING: 0
SORE THROAT: 0
NAUSEA: 0
RHINORRHEA: 0
COUGH: 0
SHORTNESS OF BREATH: 0
DIARRHEA: 0
CONSTIPATION: 0

## 2020-12-16 NOTE — PROGRESS NOTES
Called dtr Fariha Muller, state td pt baseline is confused and doesn't understand thing, he does flail arms and grunts at times. Noncommunicating. Does not walk or talk. On RA at home. Is bed bound. Was living in [place] in [address] [place] [address], dtr just moved him here 2 weeks ago. Has a wife in [place] that makes decisions still name is Jojo See, [phone]. She stated he eats regular diet. Dtr stated she and the wife are meeting with [facility] today about possibly admitting to hospice.

## 2020-12-16 NOTE — PROGRESS NOTES
Report given to April RN with [facility], will accept pt, was setting up transport to home.

## 2020-12-16 NOTE — PROGRESS NOTES
Pt fed mashed potatoes and meatloaf with ice water. Cleaned and changed brief. Small BM noted and incontinent urine.

## 2020-12-16 NOTE — CARE COORDINATION
Case Management Assessment  Initial Evaluation      Patient Name: Kiley Galeas  YOB: 1945  Diagnosis: No admission diagnoses are documented for this encounter. Date / Time: 12/16/2020 10:04 AM    Admission status/Date: Assessed while in Emergency Room  Chart Reviewed: Yes      Patient [employer]: No   Family Interviewed:  Yes - pt's daughter Kevin Lewis at [phone] and pt's wife Maira Carr at [phone]    Hospitalization in the last 30 days:  Yes from 12/9/2020 to 12/14/2020 with Acute respiratory failure with hypoxia. Pt discharged on 12/14/2020 with [facility] home care [facility]) back to his daughters who provides 24/7 care.       Health Care Decision Maker :     (CM - must 1st enter selection under Navigator - emergency contact- Health Care Decision Maker Relationship and pick relationship)   Who do you trust or have selected to make healthcare decisions for you      Met with: pt's daughter Ej Wiggins and wife Nithin Fischer conducted  (bedside/phone): phone call    Current PCP: Dr. Frank Giordano required for SNF : N          3 night stay required - Y-unless waived due to covid-19    ADLS  Support Systems/Care Needs:  family  Transportation: EMS transportation    Meal Preparation: family     Housing  Living Arrangements: Pt lives with his daughter Ej Wiggins who provides 24/7 care  Steps: 2  Intent for return to present living arrangements: Yes per wife Jarrett Has  Identified Issues: None noted    Home Care Information  Active with [address] : Yes - [facility] home care Agency:(Services) RN/PT/OT     Passport/Waiver : No  :                      Phone Number:    Passport/Waiver Services: n/a          Durable Medical Equiptment   DME Provider:   Equipment:   Walker___Cane___RTS___ BSC___Shower Chair___Hospital Bed_x__W/C__x__Other________  02 at ____Liter(s)---wears(frequency)_______ [facility] ___ CPAP___ BiPap___ N/A____      Home O2 Use :  No    If No for home O2---if presently on O2 during hospitalization:  Yes  if yes CM to follow for potential DC O2 need  Informed of need for care provider to bring portable home O2 tank on day of discharge for nursing to connect prior to leaving:   Not Indicated  Verbalized agreement/Understanding:   Not Indicated    Community Service Affiliation  Dialysis:  No    · Agency:  · Location:  · Dialysis Schedule:  · Phone:   · Fax: Other Community Services: n/a    DISCHARGE PLAN: Explained Case Management role/services. Chart review completed. Received call from [facility] stating they spoke with pt's daughter Nancy Baca and obtained the phone number for pt's wife. Called and spoke with pt's daughter Nacny Baca who stated pt's wife Alexander Clark is aware of everything going on and [facility] is trying to speak with her to obtain consents. Marmicheal Phuong stated that Hood Sevilla RN with [facility] number is [phone]. Nicolasa little writer will [employer] and she was in agreement. Message left for Alexander Clark, pt's wife and she called writer right back. Alexander Clark stated the plan is for pt to return to [facility] on discharge and possibly with [facility]. Discussed [facility] coming to do an eval while in the ER to possible enroll him from there. Alexander Clark provided verbal permission to make a referral to [facility] to have them see pt today. Alexander Clark stated she has to call Hood Sevilla back with [facility] as she called her. Alexander Clark denied needs from CM at this time. Placed Cass's phone number in emergency contacts. Vannesa RN of the above and stated she would obtain a hospice order. Ivy Joy, RN with [facility] at [phone] but there was no answer. While calling [facility] at [phone] and speaking with Chris Licea RN called writer back. She stated that she will call the admissions nurse to have someone come eval pt today in the ER.  She stated she would notify the admissions team

## 2020-12-16 NOTE — CARE COORDINATION
Received call from SHANE Eng with [facility] stating she will be here in an hour or so to see pt for the admission. April requested a discharge order and paperwork be complete so she can arrange discharge when she arrives. She also stated that no medication needed unless new medications on discharge. Notified pt's RN, [facility] of the above and requested her to notify MD and she stated understanding. DISCHARGE ORDER  Date/Time 2020 1:57 PM  Completed by: NATALYA Tang. Case Management    Patient Name: Jeri Quintero      : 1945  Admitting Diagnosis: No admission diagnoses are documented for this encounter. Admit order Date and Status: Emergency Room  (verify MD's last order for status of admission)      Noted discharge order. Confirmed discharge plan: Message left for pt's wife Burak Garibay requesting a call back. Yes with pt's daughter Otoniel Mcconnell via phone call. Discharge Plan: Reviewed chart. Role of discharge planner explained and patient verbalized understanding. Discharge order is noted. Message left for Adeline Watson, pt's wife asking for a return call. Adeline Watson confirmed plan was for pt to return to [facility] on discharge in an earlier conversation (see note from writer this morning). Spoke with Kelvin Tay, pt's daughter via phone call who stated she has spoken with [facility] and all the consents/papers have been completed. She stated that they have spoken with pt's wife Adeline Watson as well. Kelvin Tay stated that [employer] just called her and notified her that a Hospital Bed, Oxygen, Manual Lift and a Wheelchair was being delivered today. Kelvin Tay denied needing anything additional from  at this time. Has Home O2 in place on admit:  No    Pt is being d/c'd to home today with [facility]. Pt's O2 sats are 97% on 3 liters. SHANE Eng with [facility] stated she would order/arrange for Home o2 and home DME in an earlier conversation with writer. Discharge timeout done with [facility] RN. All discharge needs and concerns addressed. Addendum at 2:16pm: Received call back from pt's wife Katty Singleton who was updated on the above. She stated understanding and aware that the DME would be delivered to Comins's house prior to pt arriving as writer just spoke with Jemma. Katty Singleton stated agreement with plan. Katty Singleton confirmed she spoke with [employer] and gave permission for [facility] to sign papers as she was [place] of [place]. Katty Singleton confirmed the plan is for pt to enroll in hospice services through [facility] when writer inquired. Katty Singleton denied further questions or needs from CM. Transferred her back to ER to speak with RN for an update. Addendum at 3:16pm: Received call from SHANE Eng with [facility] stating she is here in the Emergency room and needed pt's social as she is in the process of setting up transportation. Provided that to April. April stated she would notify CM when everything was arranged. Addendum at 3:58pm: Spoke with SHANE Eng with [facility] via phone. She stated the earliest  time was through [place] at 12:30am as they have to carry him in on a backboard due to the house set up. She stated she is calling family now about  time. She confirmed the HB, O2, WC and lift will be at pt's house.

## 2020-12-16 NOTE — ED PROVIDER NOTES
Emergency Physician Note  [address] 11 [facility] ED  [address] [facility] [place] [address]  Dept: [phone]  Loc: [phone]  Open Note Time:  11:25 AM EST    Chief Complaint  Shortness of Breath (covid positive on 9th)       History of Present Illness  Daniela Guaman is a 76 y.o. male  has a past medical history of Arthritis, Cancer ([facility] [address] Cerebral artery occlusion with cerebral infarction ([facility] [address] Congenital heart disease, COVID-19, and Type 2 diabetes mellitus without complication ([facility] [address] who presents to the ED for shortness of breath. Patient is a poor historian however he is able to nod or shake his head to answer my questions. At this time he denies chest pain however is feeling short of breath. Denies abdominal pain, denied nausea and vomiting. Denies sore throat. Patient is currently living with his daughter, was recently discharged from skilled nursing facility in [place] and has been with the daughter for the past 2 weeks. He does not use oxygen at home. EMS reports when they arrived they found his oxygen saturation to be in the low 80s. Unable to assess review of systems as patient is a poor historian. Unless otherwise stated in this report or unable to obtain because of the patient's clinical or mental status as evidenced by the medical record, this patient's positive and negative responses for review of systems, constitutional, psych, eyes, ENT, cardiovascular, respiratory, gastrointestinal, neurological, genitourinary, musculoskeletal, integument systems and systems related to the presenting problem are either stated in the preceding paragraph or were not pertinent or were negative for the symptoms and/or complaints related to the medical problem. I have reviewed the following from the nursing documentation:      Prior to Admission medications    Medication Sig Start Date End Date Taking?  Authorizing Provider   dexamethasone (DECADRON) 6 MG tablet Take 1 tablet by mouth daily for 4 days 12/14/20 12/18/20  DANIEL Badillo   QUEtiapine (SEROQUEL) 25 MG tablet Take 0.5 tablets by mouth 2 times daily as needed for Agitation 12/14/20   DANIEL Badillo   Donepezil HCl (ARICEPT) 23 MG TABS tablet Take 23 mg by mouth nightly    Historical Provider, MD   famotidine (PEPCID) 20 MG tablet Take 1 tablet by mouth 2 times daily 12/9/20   Maynor Gifford MD   folic acid (FOLVITE) 1 MG tablet Take 1 tablet by mouth daily 12/9/20   Maynor Gifford MD   dutasteride (AVODART) 0.5 MG capsule Take 1 capsule by mouth daily 12/9/20   Maynor Gifford MD   tamsulosin (FLOMAX) 0.4 MG capsule Take 1 capsule by mouth daily 12/9/20   Maynor Gifford MD   rosuvastatin (CRESTOR) 40 MG tablet Take 1 tablet by mouth daily 12/9/20   Maynor Gfiford MD   prasugrel (EFFIENT) 5 MG TABS Take 1 tablet by mouth daily 12/9/20   Maynor Gifford MD   Insulin Syringe-Needle U-100 (INSULIN SYRINGE .3CC/31GX5/16\") 31G X 5/16\" 0.3 ML MISC 1 each by Does not apply route daily 12/8/20   Maynor Gifford MD   levETIRAcetam (KEPPRA) 500 MG tablet Take 1 tablet by mouth 2 times daily 12/8/20   Maynor Gifford MD   metFORMIN (GLUCOPHAGE) 500 MG tablet Take 1 tablet by mouth 2 times daily (with meals) 12/8/20   Maynor Gifford MD   montelukast (SINGULAIR) 10 MG tablet Take 1 tablet by mouth nightly 12/8/20   Maynor Gifford MD   sertraline (ZOLOFT) 25 MG tablet Take 1 tablet by mouth daily 12/8/20   Maynor Gifford MD   metoprolol succinate (TOPROL XL) 25 MG extended release tablet Take 0.5 tablets by mouth daily 12/8/20   Maynor Gifford MD   Blood Pressure KIT 1 kit by Does not apply route daily 12/8/20   Maynor Gifford MD       Allergies as of 12/16/2020 - Review Complete 12/16/2020   Allergen Reaction Noted    Pcn [penicillins] Rash 12/08/2020       Past Medical History:   Diagnosis Date    Arthritis     Cancer ([facility] [address]     Cerebral artery occlusion with cerebral infarction ([facility] [address]     Congenital heart disease     COVID-19 2020    Type 2 diabetes mellitus without complication [facility])         Surgical History:   Past Surgical History:   Procedure Laterality Date    CARDIAC SURGERY      5 stints        Family History:  History reviewed. No pertinent family history. Social History     Socioeconomic History    Marital status:      Spouse name: Not on file    Number of children: Not on file    Years of education: Not on file    Highest education level: Not on file   Occupational History    Not on file   Social Needs    Financial resource strain: Not hard at all    Food insecurity     Worry: Never true     Inability: Never true   [employer] needs     Medical: No     Non-medical: No   Tobacco Use    Smoking status: Former Smoker     Types: Cigarettes     Quit date:      Years since quittin.9    Smokeless tobacco: Never Used   Substance and Sexual Activity    Alcohol use: Never     Frequency: Never    Drug use: Never    Sexual activity: Not Currently   Lifestyle    Physical activity     Days per week: Not on file     Minutes per session: Not on file    Stress: Not on file   Relationships    Social connections     Talks on phone: Not on file     Gets together: Not on file     Attends [employer] service: Not on file     Active member of club or organization: Not on file     Attends meetings of clubs or organizations: Not on file     Relationship status: Not on file    Intimate partner violence     Fear of current or ex partner: Not on file     Emotionally abused: Not on file     Physically abused: Not on file     Forced sexual activity: Not on file   Other Topics Concern    Not on file   Social History Narrative    Not on file       Nursing notes reviewed.     ED Triage Vitals   Enc Vitals Group      BP 20 1009 (!) 98/57      Pulse 20 1009 65      Resp 20 1009 20      Temp 20 1009 98.1 °F (36.7 °C)      Temp Source 20 1009 Axillary      SpO2 12/16/20 1015 99 %      Weight 12/16/20 1009 143 lb (64.9 kg)      Height 12/16/20 1009 5' 8\" (1.727 m)      Head Circumference --       Peak Flow --       Pain Score --       Pain Loc --       Pain Edu? --       Excl. in GC? --        GENERAL:   Body mass index is 21.74 kg/m². Awake, alert. Well developed, cachectic with no apparent distress. Nontoxic-appearing, ill-appearing. Only answer by nodding or shaking his head and then he will be groaning and trying to get his head off of the bed. HENT:   Normocephalic, Atraumatic, no lacerations. No ENT exam due to PPE. Oropharynx clear, no tonsilar inflammation, no tonsilar exudates,   no airway obstruction, moist mucous membranes. Uvula was midline and has symmetric rise. EYES:   Conjunctiva normal,   Pupils equal round and reactive to light,   Extraocular movements normal.  NECK:  Trachea is midline. No stridor. CHEST:  Regular rate and regular rhythm, no murmurs/rubs/gallops,   normal S1/S2, chest wall non-tender. LUNGS:  Mild respiratory distress. No abdominal retractions, no sternal retractions. Shallow breathing with poor air movement , no wheezing, no rhochi, no rales  ABDOMEN:  Soft, non-tender, non-distended. No guarding. No rebound. Normal BS, no organomegaly, no abdominal masses  EXTREMITIES:  no edema,   no deformity,   distal pulses present and equal bilaterally. SKIN:  Warm, dry and intact. pallor  NEUROLOGIC:  Normal mental status. Alert   without focal motor deficit or gross sensory deficit. PSYCHIATRIC:  Not responding to internal stimuli,  responds appropriately to questions      LABS and DIAGNOSTIC RESULTS  EKG  The Ekg interpreted by me shows  normal sinus rhythm with a rate of 64  Axis is   Normal  QTc is  within an acceptable range  Intervals and Durations are unremarkable. ST Segments: normal  Delta waves, Brugada Syndrome, and Short MA are not present. Prior EKG to compare with was available.  No significant changes compared to prior EKG from December 9, 2020    RADIOLOGY  X-RAYS:  I have reviewed radiologic plain film image(s). ALL OTHER NON-PLAIN FILM IMAGES SUCH AS CT, ULTRASOUND AND MRI HAVE BEEN READ BY THE RADIOLOGIST. XR CHEST PORTABLE   Final Result   No radiographic evidence of acute pulmonary disease.             Chest X-Ray  Interpreted by: Emergency Department Physician  View: Portable chest xray  Findings: No infiltrates, No hemothorax, No pneumothorax, No effusion    LABS  Results for orders placed or performed during the hospital encounter of 12/16/20   CBC Auto Differential   Result Value Ref Range    WBC 19.8 (H) 4.0 - 11.0 K/uL    RBC 3.26 (L) 4.20 - 5.90 M/uL    Hemoglobin 10.2 (L) 13.5 - 17.5 g/dL    Hematocrit 30.8 (L) 40.5 - 52.5 %    MCV 94.4 80.0 - 100.0 fL    MCH 31.4 26.0 - 34.0 pg    MCHC 33.3 31.0 - 36.0 g/dL    RDW 14.1 12.4 - 15.4 %    Platelets 833 994 - 907 K/uL    MPV 7.3 5.0 - 10.5 fL    Neutrophils % 90.2 %    Lymphocytes % 4.6 %    Monocytes % 5.1 %    Eosinophils % 0.0 %    Basophils % 0.1 %    Neutrophils Absolute 17.9 (H) 1.7 - 7.7 K/uL    Lymphocytes Absolute 0.9 (L) 1.0 - 5.1 K/uL    Monocytes Absolute 1.0 0.0 - 1.3 K/uL    Eosinophils Absolute 0.0 0.0 - 0.6 K/uL    Basophils Absolute 0.0 0.0 - 0.2 K/uL   Comprehensive Metabolic Panel w/ Reflex to MG   Result Value Ref Range    Sodium 130 (L) 136 - 145 mmol/L    Potassium reflex Magnesium 3.7 3.5 - 5.1 mmol/L    Chloride 95 (L) 99 - 110 mmol/L    CO2 25 21 - 32 mmol/L    Anion Gap 10 3 - 16    Glucose 97 70 - 99 mg/dL    BUN 21 (H) 7 - 20 mg/dL    CREATININE 0.8 0.8 - 1.3 mg/dL    GFR Non-African American >60 >60    GFR African American >60 >60    Calcium 8.3 8.3 - 10.6 mg/dL    Total Protein 5.6 (L) 6.4 - 8.2 g/dL    Alb 3.1 (L) 3.4 - 5.0 g/dL    Albumin/Globulin Ratio 1.2 1.1 - 2.2    Total Bilirubin 0.3 0.0 - 1.0 mg/dL    Alkaline Phosphatase 139 (H) 40 - 129 U/L    ALT 32 10 - 40 U/L    AST 39 (H) 15 - 37 U/L Globulin 2.5 g/dL   Procalcitonin   Result Value Ref Range    Procalcitonin 0.22 (H) 0.00 - 0.15 ng/mL   Lactic Acid, Plasma   Result Value Ref Range    Lactic Acid 1.7 0.4 - 2.0 mmol/L   Troponin   Result Value Ref Range    Troponin <0.01 <0.01 ng/mL       SCREENINGS  NIH Score     Glascow  Escobar Coma Scale  Eye Opening: Spontaneous  Best Verbal Response: Incomprehensible speech  Best Motor Response: Withdraws from pain  [place] Coma Scale Score: 10  [facility]    Heart Score              PROCEDURES    MEDICAL DECISION MAKING    Procedures/interventions/images ordered for this visit  Orders Placed This Encounter   Procedures    XR CHEST PORTABLE    CBC Auto Differential    Comprehensive Metabolic Panel w/ Reflex to MG    Procalcitonin    Lactic Acid, Plasma    Troponin    PPE Instructions    Inpatient consult to Case Management    Droplet Plus Isolation - (Use only for COVID-19)    EKG 12 Lead       Medications ordered for this visit  Orders Placed This Encounter   Medications    OR Linked Order Group     acetaminophen (TYLENOL) tablet 650 mg     acetaminophen (TYLENOL) suppository 650 mg    0.9 % sodium chloride IV bolus 1,947 mL       ED course notes for this visit  ED Course as of Dec 17 1047   Wed Dec 16, 2020   1646 Patient will be sent home with prednisone and home 02    [MP]   Thu Dec 17, 2020   0035 Discharged to hospice    [ER]      ED Course User Index  [ER] Dai Diaz MD  [MP] DO Alexis       I wore N95 Envo mask with filter protection, facial shield and gloves when I evaluated the patient. I evaluated the patient in room 04/04    Based on what reported to me after the RN had a discussion with the daughter appears that the family already has plans to have meeting with the hospice care this this evening to determine the best course of action for her father. It was indicated to me that the daughter would like to keep her father at home.   Does not use oxygen at home so it appears that the new \"requirement is that he has oxygen available at home. I have discussed this with case management and they are working to get a hospice consult done today. This is a pleasant patient who complains of dyspnea. Based on the history and evaluation at this time, there is no significant evidence of a mechanical airflow obstruction, acute coronary syndrome, pulmonary edema, pulmonary embolism, pulmonary HTN, CHF, pneumothorax,  asthma or emphysema, toxicity or other obvious infectious etiology, sepsis, unstable arrhythmia, anemia, hemoglobin disorder, neuromuscular disorder, or any disease process requiring other immediate medical or surgical intervention at this time. There is no obvious sign of hemodynamic or cardiopulmonary instability. Etiology for symptoms is likely secondary to COVID-19. Given the late stage illness for this patient as well as the fact that he is going on hospice care, I did do a hospice care consult and the patient will be discharged home on hospice care with at home oxygen. With no sign of significant respiratory distress while observed in the department, it is felt that they are stable for d/c home with close f/u by their physician. It is understood that if the patient is not improving as expected or if other new symptoms or signs of concern develop, other etiologies or diagnoses may need to be considered requiring other tests, treatments, consultations, and/or admission. The diagnosis, plan, expected course, follow-up, and return precautions were discussed and all questions were answered. Final Impression    1. Hypoxemia requiring supplemental oxygen    2. Impaired mobility and ADLs    3. Encounter for palliative care in home hospice        Blood pressure 108/62, pulse 72, temperature 98.1 °F (36.7 °C), temperature source Axillary, resp. rate 30, height 5' 8\" (1.727 m), weight 143 lb (64.9 kg), SpO2 100 %.       Disposition  At this point I do not feel the patient requires further work up and it is reasonable to discharge the patient. I had a discussion with the patient and/or their surrogate regarding diagnosis, diagnostic testing results, treatment/ plan of care, and follow up. Patient and/or companions verbalized understanding of the ED workup, any relevant findings as well as any incidental findings, and the disposition and plan. There was shared decision-making between myself as well as the patient and/or their surrogate and we are all in agreement with discharge home. Lonnie Ruiz was an opportunity for questions and all questions were answered to the best of my ability and to the satisfaction of the patient and/or patient's family. Patient agreed to follow up as recommend for further evaluation/treatment. The patient was given strict return precautions as we discussed symptoms that would necessitate return to the ED. Patient will return to ED for new/worsening symptoms. The patient verbalized their understanding and agreement with the above plan. Please refer to AVS for further details regarding discharge instructions. Patient was given scripts for the following medications. I counseled patient how to take these medications. New Prescriptions    DEXAMETHASONE (DECADRON) 0.5 MG TABLET    Take 1 tablet by mouth daily (with breakfast) for 9 days       Patient had scripts modified or refilled for the following medications. I counseled patient how to take these medications. Modified Medications    No medications on file       Pt is in fair condition upon Discharge to home hospice.     Please note, critical care time was at least 35 minutes, obtaining history, conducting a physical exam, performing and monitoring interventions, ordering, collecting and interpreting tests, and establishing medical decision-making and discussion with the patient and/or family, specifically for management of the presenting complaint and symptoms initially, direct bedside care, reevaluation, review of records, and consultation. There was a high probability of clinically significant life-threatening deterioration in the patient's condition, which required my urgent intervention. This time does not include separately billable procedures. The note was completed using Dragon voice recognition transcription. Every effort was made to ensure accuracy; however, inadvertent transcription errors may be present despite my best efforts to edit errors.     Lu Davis MD  [address] [place] [address]       Lu Davis MD  12/16/20 [address] Tima Robles MD  12/17/20 2554

## 2020-12-16 NOTE — CARE COORDINATION
[facility]  Received notice from [facility] team pt returnig to [facility]. Pt was a recent referral to [facility]. RN to see pt for [facility] this am and sent pt via squad to ED. Pt hypoxic with spo2 85-86%. Pt was a non admit to [facility]. [facility]. Liaison to follow up with CM.     Electronically signed by Shimon Gallegos RN on 12/16/2020 at 10:42 AM

## 2020-12-16 NOTE — TELEPHONE ENCOUNTER
----- Message from Kami Julio sent at 12/16/2020 11:10 AM EST -----  Subject: Message to Provider    QUESTIONS  Information for Provider? Fuentespaz Jones called on behalf of her dad. She wanted   to let Dr. Gabriel Kaur know her dad is in [facility] because his oxygen was   very low   due to Covid.  ---------------------------------------------------------------------------  --------------  CALL BACK INFO  What is the best way for the office to contact you? OK to leave message on   voicemail  Preferred Call Back Phone Number? [phone]  ---------------------------------------------------------------------------  --------------  SCRIPT ANSWERS  Relationship to Patient? Other  Representative Name? Alfredo Jones Daughter  Is the Representative on the appropriate HIPAA document in Epic?  Yes

## 2020-12-17 VITALS
BODY MASS INDEX: 21.67 KG/M2 | TEMPERATURE: 98.1 F | SYSTOLIC BLOOD PRESSURE: 132 MMHG | WEIGHT: 143 LBS | DIASTOLIC BLOOD PRESSURE: 68 MMHG | HEIGHT: 68 IN | OXYGEN SATURATION: 97 % | RESPIRATION RATE: 25 BRPM | HEART RATE: 77 BPM

## 2020-12-18 ENCOUNTER — CARE COORDINATION (OUTPATIENT)
Dept: CARE COORDINATION | Age: 75
End: 2020-12-18

## 2020-12-18 NOTE — CARE COORDINATION
JIM spoke with Keira Wells ( guardian) . She was was able to verify that the patient did start with hospice services at home ( [facility]). He is still staying with his daughter Jemma. No further ER follow up calls at this time.

## 2020-12-21 NOTE — TELEPHONE ENCOUNTER
I spoke with daughter last week and she said her dad was not doing well and they are going to go with hospice